# Patient Record
Sex: FEMALE | Race: WHITE | NOT HISPANIC OR LATINO | Employment: FULL TIME | URBAN - METROPOLITAN AREA
[De-identification: names, ages, dates, MRNs, and addresses within clinical notes are randomized per-mention and may not be internally consistent; named-entity substitution may affect disease eponyms.]

---

## 2018-09-05 ENCOUNTER — OFFICE VISIT (OUTPATIENT)
Dept: URGENT CARE | Facility: PHYSICIAN GROUP | Age: 33
End: 2018-09-05

## 2018-09-05 ENCOUNTER — HOSPITAL ENCOUNTER (OUTPATIENT)
Facility: MEDICAL CENTER | Age: 33
End: 2018-09-05
Attending: PHYSICIAN ASSISTANT

## 2018-09-05 VITALS
TEMPERATURE: 98.7 F | HEIGHT: 64 IN | BODY MASS INDEX: 20.83 KG/M2 | WEIGHT: 122 LBS | RESPIRATION RATE: 18 BRPM | HEART RATE: 69 BPM | SYSTOLIC BLOOD PRESSURE: 138 MMHG | DIASTOLIC BLOOD PRESSURE: 72 MMHG | OXYGEN SATURATION: 97 %

## 2018-09-05 DIAGNOSIS — N89.8 VAGINAL DISCHARGE: ICD-10-CM

## 2018-09-05 DIAGNOSIS — N76.5 VAGINAL ULCERATION: ICD-10-CM

## 2018-09-05 PROCEDURE — 87591 N.GONORRHOEAE DNA AMP PROB: CPT

## 2018-09-05 PROCEDURE — 99204 OFFICE O/P NEW MOD 45 MIN: CPT | Performed by: PHYSICIAN ASSISTANT

## 2018-09-05 PROCEDURE — 87491 CHLMYD TRACH DNA AMP PROBE: CPT

## 2018-09-05 PROCEDURE — 87480 CANDIDA DNA DIR PROBE: CPT

## 2018-09-05 PROCEDURE — 87660 TRICHOMONAS VAGIN DIR PROBE: CPT

## 2018-09-05 PROCEDURE — 87529 HSV DNA AMP PROBE: CPT

## 2018-09-05 PROCEDURE — 87510 GARDNER VAG DNA DIR PROBE: CPT

## 2018-09-05 RX ORDER — CLINDAMYCIN HYDROCHLORIDE 300 MG/1
300 CAPSULE ORAL 3 TIMES DAILY
Qty: 20 CAP | Refills: 0 | Status: SHIPPED | OUTPATIENT
Start: 2018-09-05 | End: 2018-09-15

## 2018-09-05 RX ORDER — VALACYCLOVIR HYDROCHLORIDE 1 G/1
1000 TABLET, FILM COATED ORAL 3 TIMES DAILY
Qty: 21 TAB | Refills: 0 | Status: SHIPPED | OUTPATIENT
Start: 2018-09-05 | End: 2018-09-12

## 2018-09-05 RX ORDER — LIDOCAINE 40 MG/G
1 CREAM TOPICAL 2 TIMES DAILY PRN
Qty: 1 TUBE | Refills: 0 | Status: SHIPPED | OUTPATIENT
Start: 2018-09-05 | End: 2020-01-09

## 2018-09-06 DIAGNOSIS — N89.8 VAGINAL DISCHARGE: ICD-10-CM

## 2018-09-06 DIAGNOSIS — N76.5 VAGINAL ULCERATION: ICD-10-CM

## 2018-09-06 ASSESSMENT — ENCOUNTER SYMPTOMS
PALPITATIONS: 0
FLANK PAIN: 0
FEVER: 0
BACK PAIN: 0
CHILLS: 0
COUGH: 0
SHORTNESS OF BREATH: 0

## 2018-09-06 NOTE — PATIENT INSTRUCTIONS
Genital Herpes  Genital herpes is a common sexually transmitted infection (STI) that is caused by a virus. The virus is spread from person to person through sexual contact. Infection can cause itching, blisters, and sores in the genital area or rectal area. This is called an outbreak. It affects both men and women.  Genital herpes is particularly concerning for pregnant women because the virus can be passed to the baby during delivery and cause serious problems. Genital herpes is also a concern for people with a weakened defense (immune) system.  Symptoms of genital herpes may last several days and then go away. However, the virus remains in your body, so you may have more outbreaks of symptoms in the future. The time between outbreaks varies and can be months or years.  CAUSES  Genital herpes is caused by a virus called herpes simplex virus (HSV) type 2 or HSV type 1. These viruses are contagious and are most often spread through sexual contact with an infected person. Sexual contact includes vaginal, anal, and oral sex.  RISK FACTORS  Risk factors for genital herpes include:  · Being sexually active with multiple partners.  · Having unprotected sex.  SIGNS AND SYMPTOMS  Symptoms may include:  · Pain and itching in the genital area or rectal area.  · Small red bumps that turn into blisters and then turn into sores.  · Flu-like symptoms, including:  ¨ Fever.  ¨ Body aches.  · Painful urination.  · Vaginal discharge.  DIAGNOSIS  Genital herpes may be diagnosed by:  · Physical exam.  · Blood test.  · Fluid culture test from an open sore.  TREATMENT  There is no cure for genital herpes. Oral antiviral medicines may be used to speed up healing and to help prevent the return of symptoms. These medicines can also help to reduce the spread of the virus to sexual partners.  HOME CARE INSTRUCTIONS  · Keep the affected areas dry and clean.  · Take medicines only as directed by your health care provider.  · Do not have sexual  contact during active infections. Genital herpes is contagious.  · Practice safe sex. Latex condoms and female condoms may help to prevent the spread of the herpes virus.  · Avoid rubbing or touching the blisters and sores. If you do touch the blister or sores:  ¨ Wash your hands thoroughly.  ¨ Do not touch your eyes afterward.  · If you become pregnant, tell your health care provider if you have had genital herpes.  · Keep all follow-up visits as directed by your health care provider. This is important.  PREVENTION  · Use condoms. Although anyone can contract genital herpes during sexual contact even with the use of a condom, a condom can provide some protection.  · Avoid having multiple sexual partners.  · Talk to your sexual partner about any symptoms and past history that either of you may have.  · Get tested before you have sex. Ask your partner to do the same.  · Recognize the symptoms of genital herpes. Do not have sexual contact if you notice these symptoms.  SEEK MEDICAL CARE IF:  · Your symptoms are not improving with medicine.  · Your symptoms return.  · You have new symptoms.  · You have a fever.  · You have abdominal pain.  · You have redness, swelling, or pain in your eye.  MAKE SURE YOU:  · Understand these instructions.  · Will watch your condition.  · Will get help right away if you are not doing well or get worse.  This information is not intended to replace advice given to you by your health care provider. Make sure you discuss any questions you have with your health care provider.  Document Released: 12/15/2001 Document Revised: 01/08/2016 Document Reviewed: 05/05/2015  [employer] Interactive Patient Education © 2017 [employer]

## 2018-09-06 NOTE — PROGRESS NOTES
Subjective:      Jazmine Reinoso is a 32 y.o. female who presents with Rash (vaginal rash, thought was razor burn , discharge, white bumps)            Vaginal Pain   This is a new problem. The current episode started in the past 7 days. The problem occurs constantly. The problem has been gradually worsening. Pertinent negatives include no chest pain, chills, coughing or fever. Associated symptoms comments: Vaginal discharge/ulcerations/blisters  . Nothing aggravates the symptoms. She has tried nothing for the symptoms.       Review of Systems   Constitutional: Negative for chills and fever.   Respiratory: Negative for cough and shortness of breath.    Cardiovascular: Negative for chest pain and palpitations.   Genitourinary: Positive for dysuria and vaginal pain. Negative for flank pain, frequency, hematuria and urgency.        Vaginal discharge/rash   Musculoskeletal: Negative for back pain.   All other systems reviewed and are negative.    PMH:  has no past medical history on file.  MEDS:   Current Outpatient Prescriptions:   •  valacyclovir (VALTREX) 1 GM Tab, Take 1 Tab by mouth 3 times a day for 7 days., Disp: 21 Tab, Rfl: 0  •  clindamycin (CLEOCIN) 300 MG Cap, Take 1 Cap by mouth 3 times a day for 10 days., Disp: 20 Cap, Rfl: 0  •  lidocaine (LMX) 4 % Cream, Apply 1 Application to affected area(s) 2 times a day as needed., Disp: 1 Tube, Rfl: 0  •  tramadol (ULTRAM) 50 MG Tab, Take 1-2 Tabs by mouth every 6 hours as needed., Disp: 12 Tab, Rfl: 0  •  azithromycin (ZITHROMAX Z-SCOTTY) 250 MG TABS, 2 tablets by mouth on day  then 1 tablet by mouth daily for the next 4 days, Disp: 6 Tab, Rfl: 0  •  benzonatate (TESSALON) 100 MG CAPS, Take 1 Cap by mouth 3 times a day as needed for Cough., Disp: 20 Cap, Rfl: 0  ALLERGIES:   Allergies   Allergen Reactions   • Amoxicillin    • Pcn [Penicillins]      SURGHX: No past surgical history on file.  SOCHX:  reports that she has quit smoking. She has never used smokeless  "tobacco. She reports that she drinks alcohol. She reports that she uses drugs.  FH: Family history was reviewed, no pertinent findings to report  Medications, Allergies, and current problem list reviewed today in Epic       Objective:     /72   Pulse 69   Temp 37.1 °C (98.7 °F)   Resp 18   Ht 1.626 m (5' 4\")   Wt 55.3 kg (122 lb)   SpO2 97%   BMI 20.94 kg/m²      Physical Exam   Constitutional: She is oriented to person, place, and time. She appears well-developed and well-nourished.   HENT:   Head: Normocephalic and atraumatic.   Right Ear: External ear normal.   Left Ear: External ear normal.   Nose: Nose normal.   Mouth/Throat: Oropharynx is clear and moist.   Neck: Normal range of motion. Neck supple.   Cardiovascular: Normal rate, regular rhythm and normal heart sounds.    Pulmonary/Chest: Effort normal and breath sounds normal.   Abdominal: Soft.   Genitourinary: Vaginal discharge found.   Genitourinary Comments: Multiple vesicular and ulcerated lesions on the vulva/labia.  Thin green discharge in cervix   Musculoskeletal: She exhibits no tenderness.   No CVA tenderness present.   Neurological: She is alert and oriented to person, place, and time.   Skin: Skin is warm and dry.   Psychiatric: She has a normal mood and affect. Her behavior is normal. Judgment and thought content normal.   Vitals reviewed.              Assessment/Plan:   Chaperone in room.  Likely herpes simplex with bacterial vaginosis.  Cultures pending.  1. Vaginal discharge    - CHLAMYDIA/GC PCR URINE OR SWAB; Future  - VAGINAL PATHOGENS DNA PANEL; Future  - clindamycin (CLEOCIN) 300 MG Cap; Take 1 Cap by mouth 3 times a day for 10 days.  Dispense: 20 Cap; Refill: 0  - lidocaine (LMX) 4 % Cream; Apply 1 Application to affected area(s) 2 times a day as needed.  Dispense: 1 Tube; Refill: 0    2. Vaginal ulceration    - valacyclovir (VALTREX) 1 GM Tab; Take 1 Tab by mouth 3 times a day for 7 days.  Dispense: 21 Tab; Refill: 0  - " lidocaine (LMX) 4 % Cream; Apply 1 Application to affected area(s) 2 times a day as needed.  Dispense: 1 Tube; Refill: 0  - HSV 1/2 BY PCR(HERPES); Future    Differential diagnosis, natural history, supportive care discussed. Follow-up with primary care provider within 7-10 days, emergency room precautions discussed.  Patient and/or family appears understanding of information.  Handout and review of patients diagnosis and treatment was discussed extensively.

## 2018-09-07 LAB
C TRACH DNA SPEC QL NAA+PROBE: NEGATIVE
CANDIDA DNA VAG QL PROBE+SIG AMP: NEGATIVE
G VAGINALIS DNA VAG QL PROBE+SIG AMP: POSITIVE
HSV1 DNA SPEC QL NAA+PROBE: POSITIVE
HSV2 DNA SPEC QL NAA+PROBE: NEGATIVE
N GONORRHOEA DNA SPEC QL NAA+PROBE: NEGATIVE
SPECIMEN SOURCE: ABNORMAL
SPECIMEN SOURCE: NORMAL
T VAGINALIS DNA VAG QL PROBE+SIG AMP: NEGATIVE

## 2018-09-24 ENCOUNTER — TELEPHONE (OUTPATIENT)
Dept: URGENT CARE | Facility: PHYSICIAN GROUP | Age: 33
End: 2018-09-24

## 2018-09-24 NOTE — TELEPHONE ENCOUNTER
Pt called stating that she never got a call back regarding her test results. I spoke to Pablo and he stated that he left a voicemail with the positive results. I called her back and relayed the results again.     She stated that she was having an outbreak of lesions and that she needs more medication. She was instructed to be seen again at the UC or ER.       Thank you

## 2019-12-28 ENCOUNTER — TELEPHONE (OUTPATIENT)
Dept: SCHEDULING | Facility: IMAGING CENTER | Age: 34
End: 2019-12-28

## 2020-01-09 ENCOUNTER — OFFICE VISIT (OUTPATIENT)
Dept: MEDICAL GROUP | Facility: PHYSICIAN GROUP | Age: 35
End: 2020-01-09
Payer: COMMERCIAL

## 2020-01-09 VITALS
RESPIRATION RATE: 18 BRPM | HEIGHT: 64 IN | HEART RATE: 100 BPM | SYSTOLIC BLOOD PRESSURE: 120 MMHG | TEMPERATURE: 98 F | OXYGEN SATURATION: 98 % | DIASTOLIC BLOOD PRESSURE: 80 MMHG | BODY MASS INDEX: 19.81 KG/M2 | WEIGHT: 116 LBS

## 2020-01-09 DIAGNOSIS — A60.00 HERPES SIMPLEX INFECTION OF GENITOURINARY SYSTEM: ICD-10-CM

## 2020-01-09 DIAGNOSIS — Z13.6 SCREENING FOR CARDIOVASCULAR CONDITION: ICD-10-CM

## 2020-01-09 DIAGNOSIS — Z82.49 FAMILY HISTORY OF HEART ATTACK: ICD-10-CM

## 2020-01-09 DIAGNOSIS — F39 MOOD DISORDER (HCC): ICD-10-CM

## 2020-01-09 PROCEDURE — 99203 OFFICE O/P NEW LOW 30 MIN: CPT | Performed by: FAMILY MEDICINE

## 2020-01-09 RX ORDER — VALACYCLOVIR HYDROCHLORIDE 1 G/1
1000 TABLET, FILM COATED ORAL 2 TIMES DAILY
Qty: 14 TAB | Refills: 2 | Status: SHIPPED | OUTPATIENT
Start: 2020-01-09 | End: 2021-05-13

## 2020-01-09 ASSESSMENT — PATIENT HEALTH QUESTIONNAIRE - PHQ9: CLINICAL INTERPRETATION OF PHQ2 SCORE: 0

## 2020-01-09 NOTE — PROGRESS NOTES
"cc: Herpes      Subjective:     Jazmine Reinoso is a 34 y.o. female presenting for the following:     Patient with first outbreak of genital herpes about a year and a half ago.  She was getting a lot of outbreaks at first but now they do seem to be spacing more.  It was improved with Valtrex in the past.  She did not have any side effect with this medication.  She is wondering if she can have a course for home.  She does not have symptoms currently.    Patient has had some difficulty with mood over the last week.  She has been feeling some low mood, anhedonia, decreased sleep.  She did lose her mother about 15 years ago and it has been more difficult during the holidays.  No thoughts of hurting herself or suicidal/homicidal ideation.  She is normally very upbeat and does not have problems with depression or anxiety in the past.    Review of systems:  All others reviewed and are negative.       Current Outpatient Medications:   •  valacyclovir (VALTREX) 1 GM Tab, Take 1 Tab by mouth 2 times a day., Disp: 14 Tab, Rfl: 2    Allergies, past medical history, past surgical history, family history, social history reviewed and updated    Objective:     Vitals: /80 (BP Location: Right arm, Patient Position: Sitting, BP Cuff Size: Adult)   Pulse 100   Temp 36.7 °C (98 °F) (Temporal)   Resp 18   Ht 1.626 m (5' 4\")   Wt 52.6 kg (116 lb)   SpO2 98%   BMI 19.91 kg/m²   General: Alert, pleasant, NAD  HEENT: Normocephalic.   EOMI, no icterus or pallor.  Conjunctivae and lids normal. External ears and nose normal. Oropharynx non-erythematous, mucous membranes moist.    Neck supple.  No thyromegaly or masses palpated. No cervical or supraclavicular lymphadenopathy.  Heart: Regular rate and rhythm.  S1 and S2 normal.  No murmurs appreciated.  Respiratory: Normal respiratory effort.  Clear to auscultation bilaterally.  Abdomen: Non-distended, soft  Skin: Warm, dry, no rashes in exposed areas.  Musculoskeletal: Gait is " normal.  Moves all extremities well.  Extremities: No leg edema.    Neurological: sensation grossly intact,  tone/strength normal, gait is normal, CN2-12 grossly intact  Psych:  Affect is normal, judgement is good, grooming is appropriate.    Assessment/Plan:     Jazmine was seen today for establish care.    Diagnoses and all orders for this visit:    Herpes simplex infection of genitourinary system: As patient is now only having an outbreak every 4-6 months, will give Valtrex to take at onset of symptoms.  Patient warned of common side effects of this medication but she has taken it in the past without side effect.  -     valacyclovir (VALTREX) 1 GM Tab; Take 1 Tab by mouth 2 times a day.    Mood disorder (HCC): Patient feeling down for about the last week.  Symptoms have been slowly improving and no suicidal thoughts.  This is just abnormal for the patient as she tends to be very upbeat.  So we will check for anemia, thyroid dysfunction, hepatic dysfunction.  -     Comp Metabolic Panel; Future  -     CBC WITHOUT DIFFERENTIAL; Future  -     TSH WITH REFLEX TO FT4; Future    Family history of heart attack: Father with heart attack in his 50s.  She has never had lipid profile checked so we will check once while she is young to ensure no familial hypercholesterolemia.  -     Lipid Profile; Future    Screening for cardiovascular condition  -     Lipid Profile; Future      Return in about 1 week (around 1/16/2020), or if symptoms worsen or fail to improve, for pap.

## 2020-01-15 ENCOUNTER — HOSPITAL ENCOUNTER (OUTPATIENT)
Dept: LAB | Facility: MEDICAL CENTER | Age: 35
End: 2020-01-15
Attending: FAMILY MEDICINE
Payer: COMMERCIAL

## 2020-01-15 DIAGNOSIS — F39 MOOD DISORDER (HCC): ICD-10-CM

## 2020-01-15 DIAGNOSIS — Z13.6 SCREENING FOR CARDIOVASCULAR CONDITION: ICD-10-CM

## 2020-01-15 DIAGNOSIS — Z82.49 FAMILY HISTORY OF HEART ATTACK: ICD-10-CM

## 2020-01-15 LAB
ALBUMIN SERPL BCP-MCNC: 4.7 G/DL (ref 3.2–4.9)
ALBUMIN/GLOB SERPL: 2 G/DL
ALP SERPL-CCNC: 46 U/L (ref 30–99)
ALT SERPL-CCNC: 16 U/L (ref 2–50)
ANION GAP SERPL CALC-SCNC: 10 MMOL/L (ref 0–11.9)
AST SERPL-CCNC: 22 U/L (ref 12–45)
BILIRUB SERPL-MCNC: 1 MG/DL (ref 0.1–1.5)
BUN SERPL-MCNC: 9 MG/DL (ref 8–22)
CALCIUM SERPL-MCNC: 9.6 MG/DL (ref 8.5–10.5)
CHLORIDE SERPL-SCNC: 101 MMOL/L (ref 96–112)
CHOLEST SERPL-MCNC: 151 MG/DL (ref 100–199)
CO2 SERPL-SCNC: 26 MMOL/L (ref 20–33)
CREAT SERPL-MCNC: 0.73 MG/DL (ref 0.5–1.4)
ERYTHROCYTE [DISTWIDTH] IN BLOOD BY AUTOMATED COUNT: 43 FL (ref 35.9–50)
FASTING STATUS PATIENT QL REPORTED: NORMAL
GLOBULIN SER CALC-MCNC: 2.4 G/DL (ref 1.9–3.5)
GLUCOSE SERPL-MCNC: 77 MG/DL (ref 65–99)
HCT VFR BLD AUTO: 41.7 % (ref 37–47)
HDLC SERPL-MCNC: 69 MG/DL
HGB BLD-MCNC: 14.4 G/DL (ref 12–16)
LDLC SERPL CALC-MCNC: 73 MG/DL
MCH RBC QN AUTO: 34 PG (ref 27–33)
MCHC RBC AUTO-ENTMCNC: 34.5 G/DL (ref 33.6–35)
MCV RBC AUTO: 98.3 FL (ref 81.4–97.8)
PLATELET # BLD AUTO: 272 K/UL (ref 164–446)
PMV BLD AUTO: 10.2 FL (ref 9–12.9)
POTASSIUM SERPL-SCNC: 3.7 MMOL/L (ref 3.6–5.5)
PROT SERPL-MCNC: 7.1 G/DL (ref 6–8.2)
RBC # BLD AUTO: 4.24 M/UL (ref 4.2–5.4)
SODIUM SERPL-SCNC: 137 MMOL/L (ref 135–145)
TRIGL SERPL-MCNC: 47 MG/DL (ref 0–149)
TSH SERPL DL<=0.005 MIU/L-ACNC: 0.96 UIU/ML (ref 0.38–5.33)
WBC # BLD AUTO: 8 K/UL (ref 4.8–10.8)

## 2020-01-15 PROCEDURE — 84443 ASSAY THYROID STIM HORMONE: CPT

## 2020-01-15 PROCEDURE — 85027 COMPLETE CBC AUTOMATED: CPT

## 2020-01-15 PROCEDURE — 80061 LIPID PANEL: CPT

## 2020-01-15 PROCEDURE — 36415 COLL VENOUS BLD VENIPUNCTURE: CPT

## 2020-01-15 PROCEDURE — 80053 COMPREHEN METABOLIC PANEL: CPT

## 2020-01-20 ENCOUNTER — OFFICE VISIT (OUTPATIENT)
Dept: MEDICAL GROUP | Facility: PHYSICIAN GROUP | Age: 35
End: 2020-01-20
Payer: COMMERCIAL

## 2020-01-20 ENCOUNTER — HOSPITAL ENCOUNTER (OUTPATIENT)
Facility: MEDICAL CENTER | Age: 35
End: 2020-01-20
Attending: FAMILY MEDICINE
Payer: COMMERCIAL

## 2020-01-20 VITALS
OXYGEN SATURATION: 96 % | HEART RATE: 95 BPM | TEMPERATURE: 98.5 F | WEIGHT: 117 LBS | DIASTOLIC BLOOD PRESSURE: 70 MMHG | HEIGHT: 64 IN | BODY MASS INDEX: 19.97 KG/M2 | SYSTOLIC BLOOD PRESSURE: 110 MMHG

## 2020-01-20 DIAGNOSIS — Z11.51 SCREENING FOR HPV (HUMAN PAPILLOMAVIRUS): ICD-10-CM

## 2020-01-20 DIAGNOSIS — Z12.4 SCREENING FOR MALIGNANT NEOPLASM OF CERVIX: ICD-10-CM

## 2020-01-20 DIAGNOSIS — Z01.419 WELL WOMAN EXAM WITH ROUTINE GYNECOLOGICAL EXAM: ICD-10-CM

## 2020-01-20 PROCEDURE — 87624 HPV HI-RISK TYP POOLED RSLT: CPT

## 2020-01-20 PROCEDURE — 99395 PREV VISIT EST AGE 18-39: CPT | Performed by: FAMILY MEDICINE

## 2020-01-20 PROCEDURE — 88175 CYTOPATH C/V AUTO FLUID REDO: CPT

## 2020-01-20 NOTE — PROGRESS NOTES
"S:  Jazmine Reinoso is a 34 y.o.,femalewho presents today for her Pap and GYN exam.  Her LMP was 2 weeks ago.  She is still having regular menses.  Her form of contraception is lack of male partner.     She is , P:0.    She has never had a pap smear previously.   Her last Mammogram was done: due at 43 yo .     GYN ROS:  normal menses, no abnormal bleeding, pelvic pain or discharge, no breast pain or new or enlarging lumps on self exam    Patient Active Problem List    Diagnosis Date Noted   • Genital herpes 2020   • Mood disorder (HCC) 2020   • Family history of heart attack 2020     Current Outpatient Medications on File Prior to Visit   Medication Sig Dispense Refill   • valacyclovir (VALTREX) 1 GM Tab Take 1 Tab by mouth 2 times a day. 14 Tab 2     No current facility-administered medications on file prior to visit.      Social History     Tobacco Use   • Smoking status: Not on file   • Smokeless tobacco: Never Used   Substance Use Topics   • Alcohol use: Yes     Comment: OCCAS       O: /70 (BP Location: Left arm, Patient Position: Sitting, BP Cuff Size: Adult)   Pulse 95   Temp 36.9 °C (98.5 °F) (Temporal)   Ht 1.626 m (5' 4\")   Wt 53.1 kg (117 lb)   SpO2 96%   BMI 20.08 kg/m²   Vitals Noted and Reviewed  Lungs: normal to auscultation  Heart: regular rate and rhythm  Vulva: grossly unremarkable, no lesions or masses noted  Vagina: no abnormal discharge, normal color  Cervix: nonparous, slightly friable, no surface lesions identified, Pap was performed  Uterus: Normal shape, position and consistency  Bimanual exam: No uteromegaly, negative chandelier sign, adnexa freely movable and without enlargements bilaterally  Rectal: not performed    Assessment/Plan:   No new sexual partners, declines STD screening.  NormalGYN Exam  Pap processed and sent to the lab.    Recommend follow up 1 yr PRN     "

## 2020-01-21 LAB
CYTOLOGY REG CYTOL: NORMAL
HPV HR 12 DNA CVX QL NAA+PROBE: NEGATIVE
HPV16 DNA SPEC QL NAA+PROBE: NEGATIVE
HPV18 DNA SPEC QL NAA+PROBE: NEGATIVE
SPECIMEN SOURCE: NORMAL

## 2020-03-13 ENCOUNTER — TELEPHONE (OUTPATIENT)
Dept: HEALTH INFORMATION MANAGEMENT | Facility: OTHER | Age: 35
End: 2020-03-13

## 2020-03-13 NOTE — TELEPHONE ENCOUNTER
1. Caller Name: Jazmine Reinoso                      Call Back Number: [phone]  [facility] PCP or Specialty Provider: Yes         2.  Does patient have any active symptoms of respiratory illness (fever OR cough OR shortness of breath)? Yes, the patient reports the following respiratory symptoms: cough and congestion and secretions. No fever. Reports no other symptoms..     3.  Does patient have any comoribidities? None     4.  In the last 30 days, has the patient traveled outside of the country OR in a high risk area within the  OR have any known contact with someone who has or is suspected to have COVID-19?  No.    5. Disposition: Advised to perform self care, monitor for worsening symptoms and to call back in 3 days if no improvement /Monitor symptoms. Provided patient with [facility] information. [employer]    Note routed to PCP: FYI only.

## 2021-05-06 ENCOUNTER — PATIENT OUTREACH (OUTPATIENT)
Dept: MEDICAL GROUP | Facility: MEDICAL CENTER | Age: 36
End: 2021-05-06

## 2021-05-10 ENCOUNTER — HOSPITAL ENCOUNTER (OUTPATIENT)
Dept: RADIOLOGY | Facility: MEDICAL CENTER | Age: 36
End: 2021-05-10
Attending: FAMILY MEDICINE
Payer: COMMERCIAL

## 2021-05-10 ENCOUNTER — OFFICE VISIT (OUTPATIENT)
Dept: URGENT CARE | Facility: PHYSICIAN GROUP | Age: 36
End: 2021-05-10
Payer: COMMERCIAL

## 2021-05-10 VITALS
TEMPERATURE: 98.5 F | OXYGEN SATURATION: 99 % | HEIGHT: 64 IN | SYSTOLIC BLOOD PRESSURE: 132 MMHG | BODY MASS INDEX: 22.2 KG/M2 | RESPIRATION RATE: 16 BRPM | HEART RATE: 77 BPM | DIASTOLIC BLOOD PRESSURE: 90 MMHG | WEIGHT: 130 LBS

## 2021-05-10 DIAGNOSIS — M54.16 LUMBAR RADICULOPATHY, ACUTE: ICD-10-CM

## 2021-05-10 DIAGNOSIS — M54.9 DORSALGIA: ICD-10-CM

## 2021-05-10 PROCEDURE — 99214 OFFICE O/P EST MOD 30 MIN: CPT | Performed by: FAMILY MEDICINE

## 2021-05-10 PROCEDURE — 72100 X-RAY EXAM L-S SPINE 2/3 VWS: CPT

## 2021-05-10 RX ORDER — CYCLOBENZAPRINE HCL 5 MG
5-10 TABLET ORAL 3 TIMES DAILY PRN
Qty: 30 TABLET | Refills: 0 | Status: SHIPPED | OUTPATIENT
Start: 2021-05-10 | End: 2021-12-20

## 2021-05-10 ASSESSMENT — FIBROSIS 4 INDEX: FIB4 SCORE: 0.67

## 2021-05-10 ASSESSMENT — ENCOUNTER SYMPTOMS: BACK PAIN: 1

## 2021-05-10 NOTE — LETTER
May 10, 2021         Patient: Jazmine Reinoso   YOB: 1985   Date of Visit: 5/10/2021           To Whom it May Concern:    Jazmine Reinoso was seen in my clinic on 5/10/2021. She may return to work in 3-4 days.    If you have any questions or concerns, please don't hesitate to call.        Sincerely,           Juan Deng M.D.  Electronically Signed

## 2021-05-10 NOTE — PROGRESS NOTES
"Subjective:      Jazmine Reinoso is a 35 y.o. female who presents with Back Injury (injured back picking up a bed lost feeling in legs x 5 days)    - This is a pleasant and nontoxic appearing 35 y.o. female with c/o was lifting/moving mattress ~ 5 days ago, felt pain to lower back then legs went numb for a few hours after. Sensation has returned to legs but still has low axial back pain that radiates a little up the back.  No fever, no trauma, no bowel/bladder dysfunction or lower limb weakness/heaviness.       ALLERGIES:  Amoxicillin and Pcn [penicillins]     PMH:  History reviewed. No pertinent past medical history.     PSH:  History reviewed. No pertinent surgical history.    MEDS:    Current Outpatient Medications:   •  cyclobenzaprine (FLEXERIL) 5 mg tablet, Take 1-2 Tablets by mouth 3 times a day as needed for Mild Pain or Muscle Spasms., Disp: 30 tablet, Rfl: 0  •  valacyclovir (VALTREX) 1 GM Tab, Take 1 Tab by mouth 2 times a day., Disp: 14 Tab, Rfl: 2    ** I have documented what I find to be significant in regards to past medical, social, family and surgical history  in my HPI or under PMH/PSH/FH review section, otherwise it is noncontributory **             HPI    Review of Systems   Musculoskeletal: Positive for back pain.   All other systems reviewed and are negative.         Objective:     /90 (BP Location: Left arm, Patient Position: Sitting, BP Cuff Size: Adult)   Pulse 77   Temp 36.9 °C (98.5 °F) (Temporal)   Resp 16   Ht 1.626 m (5' 4\")   Wt 59 kg (130 lb)   SpO2 99%   BMI 22.31 kg/m²      Physical Exam  Vitals and nursing note reviewed.   Constitutional:       General: She is not in acute distress.     Appearance: Normal appearance. She is well-developed.   HENT:      Head: Normocephalic and atraumatic.   Eyes:      General: No scleral icterus.  Cardiovascular:      Heart sounds: Normal heart sounds. No murmur.   Pulmonary:      Effort: Pulmonary effort is normal. No respiratory " distress.   Musculoskeletal:        Back:       Comments: No rash, no TTP, + pain w/ ROM. Bilateral lower extremity strength and sensory intact.  Negative straight leg raise.   Can toe and heel walk     Skin:     Coloration: Skin is not jaundiced or pale.   Neurological:      Mental Status: She is alert.      Motor: No abnormal muscle tone.   Psychiatric:         Mood and Affect: Mood normal.         Behavior: Behavior normal.                 Assessment/Plan:          1. Dorsalgia  DX-LUMBAR SPINE-2 OR 3 VIEWS    REFERRAL TO OUTPATIENT INTERVENTIONAL PAIN CLINIC    cyclobenzaprine (FLEXERIL) 5 mg tablet   2. Lumbar radiculopathy, acute  REFERRAL TO OUTPATIENT INTERVENTIONAL PAIN CLINIC       - Dx, plan & d/c instructions discussed w/ patient and/or family members  - Rest, stay hydrated OTC Motrin (3 tabs qhrs x 5 days)  - E.R. precautions discussed     Follow up with their PCP in 2-3 days, ER if not improving or feeling/getting worse.    Any realistic side effects of medications that may have been given today reviewed.     Patient left in stable condition       Please note that this dictation was created using voice recognition software. I have made every reasonable attempt to correct obvious errors, but I expect that there are errors of grammar and possibly content that I did not discover before finalizing the note.

## 2021-05-13 ENCOUNTER — OFFICE VISIT (OUTPATIENT)
Dept: MEDICAL GROUP | Facility: MEDICAL CENTER | Age: 36
End: 2021-05-13
Payer: COMMERCIAL

## 2021-05-13 VITALS
HEIGHT: 64 IN | BODY MASS INDEX: 22.2 KG/M2 | SYSTOLIC BLOOD PRESSURE: 124 MMHG | WEIGHT: 130 LBS | HEART RATE: 87 BPM | DIASTOLIC BLOOD PRESSURE: 78 MMHG | OXYGEN SATURATION: 98 % | TEMPERATURE: 97.8 F

## 2021-05-13 DIAGNOSIS — M54.50 ACUTE BILATERAL LOW BACK PAIN WITHOUT SCIATICA: ICD-10-CM

## 2021-05-13 DIAGNOSIS — A60.00 HERPES SIMPLEX INFECTION OF GENITOURINARY SYSTEM: ICD-10-CM

## 2021-05-13 DIAGNOSIS — A60.04 HERPES SIMPLEX VULVOVAGINITIS: ICD-10-CM

## 2021-05-13 PROCEDURE — 99214 OFFICE O/P EST MOD 30 MIN: CPT | Performed by: PHYSICIAN ASSISTANT

## 2021-05-13 RX ORDER — KETOROLAC TROMETHAMINE 30 MG/ML
30 INJECTION, SOLUTION INTRAMUSCULAR; INTRAVENOUS ONCE
Status: COMPLETED | OUTPATIENT
Start: 2021-05-13 | End: 2021-05-13

## 2021-05-13 RX ORDER — METHYLPREDNISOLONE 4 MG/1
TABLET ORAL
Qty: 21 TABLET | Refills: 0 | Status: SHIPPED | OUTPATIENT
Start: 2021-05-13 | End: 2021-12-20

## 2021-05-13 RX ORDER — VALACYCLOVIR HYDROCHLORIDE 500 MG/1
500 TABLET, FILM COATED ORAL 2 TIMES DAILY
Qty: 6 TABLET | Refills: 4 | Status: SHIPPED | OUTPATIENT
Start: 2021-05-13 | End: 2022-01-18 | Stop reason: SDUPTHER

## 2021-05-13 RX ADMIN — KETOROLAC TROMETHAMINE 30 MG: 30 INJECTION, SOLUTION INTRAMUSCULAR; INTRAVENOUS at 07:50

## 2021-05-13 ASSESSMENT — FIBROSIS 4 INDEX: FIB4 SCORE: 0.67

## 2021-05-13 ASSESSMENT — PATIENT HEALTH QUESTIONNAIRE - PHQ9: CLINICAL INTERPRETATION OF PHQ2 SCORE: 0

## 2021-05-13 NOTE — ASSESSMENT & PLAN NOTE
Chronic: Stable.  Patient is taking Valtrex with good effect.  Gets approximately 2-3 episodes per year in genital region.  Diagnosed approximately 2 years ago after having painful lesions in the genital region.

## 2021-05-13 NOTE — PROGRESS NOTES
Subjective:   Jazmine Reinoso is a 35 y.o. female here today for back pain and establishing care    Acute bilateral low back pain without sciatica  New Issue: Pleasant 35-year-old here to establish care reports new issue of low back pain.  Occurred after lifting a heavy mattress 7 days ago.  Felt strong sensation of discomfort that is worsening.  Movement makes it worse.  At that time felt bilateral lower extremities tingling as well as some heat in the feet.  That subsided.  Has no problems with bowel or bladder incontinence.  Denies weakness with gait.  Denies known fall trauma or injury.  With her work she lifts many boxes daily and this has become more challenging.      Genital herpes  Chronic: Stable.  Patient is taking Valtrex with good effect.  Gets approximately 2-3 episodes per year in genital region.  Diagnosed approximately 2 years ago after having painful lesions in the genital region.         Current medicines (including changes today)  Current Outpatient Medications   Medication Sig Dispense Refill   • methylPREDNISolone (MEDROL DOSEPAK) 4 MG Tablet Therapy Pack As directed on the packaging label. 21 tablet 0   • valacyclovir (VALTREX) 500 MG Tab Take 1 tablet by mouth 2 times a day for 15 days. 6 tablet 4   • cyclobenzaprine (FLEXERIL) 5 mg tablet Take 1-2 Tablets by mouth 3 times a day as needed for Mild Pain or Muscle Spasms. 30 tablet 0     No current facility-administered medications for this visit.     She  has a past medical history of Herpes (2019).  Amoxicillin and Pcn [penicillins]     Social History and Family History were reviewed and updated.    ROS   No headaches, chest pain, no shortness of breath, abdominal pain, nausea, or vomiting.  All other systems were reviewed and are negative or noted as positive in the HPI.       Objective:     /78 (BP Location: Right arm, Patient Position: Sitting, BP Cuff Size: Adult)   Pulse 87   Temp 36.6 °C (97.8 °F) (Temporal)   Ht 1.626 m (5'  "4\")   Wt 59 kg (130 lb)   SpO2 98%  Body mass index is 22.31 kg/m².     Physical Exam:  Constitutional: ANO x3, no acute distress, well-nourished, well-hydrated   Skin: Warm, dry, good turgor, no rashes in visible areas.  HEENT: Is normocephalic atraumatic, good PERRLA, extraocular movements intact, TMs and oropharynx are clear with good dentition   Neck: Soft and supple, trachea midline, no masses.  No thyroid megaly or cervical lymphadenopathy noted  Cardiovascular: Regular rate and rhythm.  Normal S1 and 2, no murmurs, rubs or gallops.  No edema noted  Lungs: Clear to auscultation bilaterally.  No wheezes, rales or rhonchi.  Good inspiratory and expiratory breath sounds  Abdomen: Soft, non-tender, no masses.  No hepatosplenomegaly.  Negative Ludwig's  Psych: Alert and oriented x3, normal affect and mood.  Neuro: Cranial nerves II through XII were assessed and intact.  Normal gait, normal cerebellar function noted  Musculoskeletal: Full range of motion, good strength against resistance patient has pain to palpation in the lower paralumbar region.  There is no pain to palpation in the cervical thoracic or lumbar spinous processes.  She has minimally decreased range of motion with flexion at the waist.  Negative straight leg raise.  Normal sensation throughout.  Good pulses and reflexes    Clinical Course/Lab Analysis:    Toradol 30 mg IM x1 given in clinic    Assessment and Plan:   The following treatment plan was discussed.  Signs and symptoms for which to return were discussed with patient at length.  Patient verbalized understanding.    1. Acute bilateral low back pain without sciatica  New: Unstable  - ketorolac (TORADOL) injection 30 mg  - methylPREDNISolone (MEDROL DOSEPAK) 4 MG Tablet Therapy Pack; As directed on the packaging label.  Dispense: 21 tablet; Refill: 0  If Symptoms worsen despite supportive care measures, Will consider MRI.  Patient does have referral to physiatry put in by urgent care and " this is pending    2. Herpes simplex vulvovaginitis    3. Herpes simplex infection of genitourinary system  Chronic: stable  - valacyclovir (VALTREX) 500 MG Tab; Take 1 tablet by mouth 2 times a day for 15 days.  Dispense: 6 tablet; Refill: 4     Will refill for recurrent episodic treatment.    My total time spent caring for the patient on the day of the encounter was 50 minutes. This does not include time spent on separately billable procedures/tests.        Followup: 2 weeks    Please note that this dictation was created using voice recognition software. I have made every reasonable attempt to correct obvious errors, but I expect that there are errors of grammar and possibly content that I did not discover before finalizing the note.

## 2021-05-13 NOTE — ASSESSMENT & PLAN NOTE
New Issue: Pleasant 35-year-old here to establish care reports new issue of low back pain.  Occurred after lifting a heavy mattress 7 days ago.  Felt strong sensation of discomfort that is worsening.  Movement makes it worse.  At that time felt bilateral lower extremities tingling as well as some heat in the feet.  That subsided.  Has no problems with bowel or bladder incontinence.  Denies weakness with gait.  Denies known fall trauma or injury.  With her work she lifts many boxes daily and this has become more challenging.

## 2021-05-13 NOTE — LETTER
May 13, 2021         Patient: Jazmine Reinoso   YOB: 1985   Date of Visit: 5/13/2021           To Whom it May Concern:    Jazmine Reinoso was seen in my clinic on 5/13/2021. She may Return to work 5-    If you have any questions or concerns, please don't hesitate to call.        Sincerely,           Fransisca Carranza P.A.-C.  Electronically Signed

## 2021-12-11 ENCOUNTER — APPOINTMENT (OUTPATIENT)
Dept: RADIOLOGY | Facility: MEDICAL CENTER | Age: 36
End: 2021-12-11
Attending: EMERGENCY MEDICINE
Payer: COMMERCIAL

## 2021-12-11 ENCOUNTER — HOSPITAL ENCOUNTER (EMERGENCY)
Facility: MEDICAL CENTER | Age: 36
End: 2021-12-11
Attending: EMERGENCY MEDICINE
Payer: COMMERCIAL

## 2021-12-11 VITALS
HEART RATE: 88 BPM | WEIGHT: 116.84 LBS | DIASTOLIC BLOOD PRESSURE: 96 MMHG | SYSTOLIC BLOOD PRESSURE: 135 MMHG | HEIGHT: 64 IN | OXYGEN SATURATION: 98 % | BODY MASS INDEX: 19.95 KG/M2 | RESPIRATION RATE: 16 BRPM | TEMPERATURE: 98.6 F

## 2021-12-11 DIAGNOSIS — J40 BRONCHITIS: ICD-10-CM

## 2021-12-11 LAB
FLUAV AG SPEC QL IA: NEGATIVE
FLUBV AG SPEC QL IA: NEGATIVE
SARS-COV+SARS-COV-2 AG RESP QL IA.RAPID: NOTDETECTED
SPECIMEN SOURCE: NORMAL

## 2021-12-11 PROCEDURE — 99283 EMERGENCY DEPT VISIT LOW MDM: CPT

## 2021-12-11 PROCEDURE — 87426 SARSCOV CORONAVIRUS AG IA: CPT

## 2021-12-11 PROCEDURE — 87400 INFLUENZA A/B EACH AG IA: CPT | Mod: 91

## 2021-12-11 PROCEDURE — C9803 HOPD COVID-19 SPEC COLLECT: HCPCS | Performed by: EMERGENCY MEDICINE

## 2021-12-11 PROCEDURE — 71045 X-RAY EXAM CHEST 1 VIEW: CPT

## 2021-12-11 RX ORDER — AZITHROMYCIN 250 MG/1
TABLET, FILM COATED ORAL
Qty: 6 TABLET | Refills: 0 | Status: SHIPPED | OUTPATIENT
Start: 2021-12-11 | End: 2021-12-20

## 2021-12-11 ASSESSMENT — FIBROSIS 4 INDEX: FIB4 SCORE: .6875

## 2021-12-11 NOTE — ED TRIAGE NOTES
"Presents complaining of productive cough, fatigue, and exertional dyspnea recurring for approximately 2 weeks.   Chief Complaint   Patient presents with   • Cough   • Fatigue   • Shortness of Breath     /98   Pulse 80   Temp 36.9 °C (98.5 °F) (Temporal)   Resp 16   Ht 1.626 m (5' 4\")   Wt 53 kg (116 lb 13.5 oz)   LMP 11/27/2021 (Approximate)   SpO2 96%   BMI 20.06 kg/m²   Has this patient been vaccinated for COVID NO  If not, would they like to be vaccinated while in the ER if eligible?  NO  Would the patient like to speak with the ERP about the possibility of receiving the COVID vaccine today before making a decision? NO    "

## 2021-12-11 NOTE — ED PROVIDER NOTES
"ED Provider Note    CHIEF COMPLAINT  Chief Complaint   Patient presents with   • Cough   • Fatigue   • Shortness of Breath       HPI  Jazmine Reinoso is a 36 y.o. female who presents for evaluation of cough and shortness of breath.  The patient states that the Sunday before Thanksgiving she began developing upper respiratory symptoms nasal congestion clear rhinorrhea along with a cough and shortness of breath.  The patient has had some slight yellow sputum production.  The patient took home Covid test which were negative.  Patient presents here because of persistent symptomatology.  Patient states that she has had some intermittent vomiting but no protracted vomiting or diarrhea.  The patient has not been vaccinated.  No previous history of Covid.  She denies any possibly pregnancy as she is lesbian.  No other acute symptomatology or complaints.    REVIEW OF SYSTEMS  See HPI for further details.  No history of hypertension, diabetes, seizures, cardiopulmonary disorders, gastrointestinal disorders.  All other systems negative.    PAST MEDICAL HISTORY  Past Medical History:   Diagnosis Date   • Herpes 2019       FAMILY HISTORY  Family History   Problem Relation Age of Onset   • Cancer Father    • Cancer Maternal Grandmother 70       SOCIAL HISTORY  Uses vape nicotine products    SURGICAL HISTORY  History reviewed. No pertinent surgical history.    CURRENT MEDICATIONS  Home Medications     Reviewed by Parvez Samuel R.N. (Registered Nurse) on 12/11/21 at 1326  Med List Status: Not Addressed   Medication Last Dose Status   cyclobenzaprine (FLEXERIL) 5 mg tablet  Active   methylPREDNISolone (MEDROL DOSEPAK) 4 MG Tablet Therapy Pack  Active                ALLERGIES  Allergies   Allergen Reactions   • Amoxicillin    • Pcn [Penicillins]        PHYSICAL EXAM  VITAL SIGNS: /98   Pulse 80   Temp 36.9 °C (98.5 °F) (Temporal)   Resp 16   Ht 1.626 m (5' 4\")   Wt 53 kg (116 lb 13.5 oz)   LMP 11/27/2021 " (Approximate)   SpO2 96%   BMI 20.06 kg/m²    Constitutional: 36-year-old female, well developed, Well nourished, No acute distress, Non-toxic appearance.   HENT: Normocephalic, Atraumatic, Nares:Clear, Oropharynx: moist, well hydrated, posterior pharynx:clear   Eyes: PERRL, EOMI, Conjunctiva normal, No discharge.   Neck: Normal range of motion, No tenderness, Supple, No stridor.   Lymphatic: No lymphadenopathy noted.   Cardiovascular: Regular rate and rhythm without mumurs, gallups, rubs   Thorax & Lungs: Normal Equal breath sounds, No respiratory distress, No wheezing, no stridor, no rales. No chest tenderness.   Abdomen: Soft, nontender, nondistended, no organomegaly, positive bowel sounds normal in quality. No guarding or rebound.  Skin: Good skin turgor, pink, warm, dry. No rashes, petechiae, purpura. Normal capillary refill.   Back: No tenderness, No CVA tenderness.   Extremities: Intact distal pulses, No edema, No tenderness, No cyanosis,  Vascular: Pulses are 2+, symmetric in the upper and lower extremities.  Musculoskeletal: Good range of motion in all major joints. No tenderness to palpation or major deformities noted.   Neurologic: Alert & oriented x 3,  No gross focal deficits noted.   Psychiatric: Affect normal, Judgment normal, Mood normal.       RADIOLOGY/PROCEDURES  DX-CHEST-PORTABLE (1 VIEW)   Final Result      1.  There is no acute cardiopulmonary process.            COURSE & MEDICAL DECISION MAKING  Pertinent Labs & Imaging studies reviewed. (See chart for details)  Laboratory studies: Covid negative    Discussion: At this time, the patient presents with 3 weeks of persistent upper respiratory symptoms.  The patient is having worsening cough and yellow sputum production.  Chest x-ray shows no evidence of acute pneumonia.  I discussed the clinical findings with the patient.  A treatment plan was discussed and will be initiated.  Patient indicates she is comfortable with this explanation  disposition.    FINAL IMPRESSION  1. Bronchitis        PLAN  1.  Appropriate discharge instructions given  2.  Azithromycin, Z-Elmo  3.  Follow-up with primary care    Electronically signed by: Guy G Gansert, M.D., 12/11/2021 2:19 PM

## 2021-12-12 NOTE — ED NOTES
Discharged to home with instructions to follow up with her doctor as needed. Zithromax prescription given to patient.

## 2021-12-20 ENCOUNTER — APPOINTMENT (OUTPATIENT)
Dept: RADIOLOGY | Facility: IMAGING CENTER | Age: 36
End: 2021-12-20
Attending: NURSE PRACTITIONER
Payer: COMMERCIAL

## 2021-12-20 ENCOUNTER — HOSPITAL ENCOUNTER (OUTPATIENT)
Facility: MEDICAL CENTER | Age: 36
End: 2021-12-20
Attending: NURSE PRACTITIONER
Payer: COMMERCIAL

## 2021-12-20 ENCOUNTER — OFFICE VISIT (OUTPATIENT)
Dept: URGENT CARE | Facility: CLINIC | Age: 36
End: 2021-12-20
Payer: COMMERCIAL

## 2021-12-20 VITALS
WEIGHT: 119 LBS | TEMPERATURE: 98 F | DIASTOLIC BLOOD PRESSURE: 70 MMHG | SYSTOLIC BLOOD PRESSURE: 116 MMHG | HEART RATE: 82 BPM | RESPIRATION RATE: 18 BRPM | OXYGEN SATURATION: 97 % | BODY MASS INDEX: 20.32 KG/M2 | HEIGHT: 64 IN

## 2021-12-20 DIAGNOSIS — J06.9 URI, ACUTE: ICD-10-CM

## 2021-12-20 DIAGNOSIS — R05.9 COUGH: ICD-10-CM

## 2021-12-20 LAB
EXTERNAL QUALITY CONTROL: NORMAL
FLUAV+FLUBV AG SPEC QL IA: NEGATIVE
INT CON NEG: NEGATIVE
INT CON NEG: NEGATIVE
INT CON POS: POSITIVE
INT CON POS: POSITIVE
S PYO AG THROAT QL: POSITIVE
SARS-COV+SARS-COV-2 AG RESP QL IA.RAPID: NEGATIVE

## 2021-12-20 PROCEDURE — U0005 INFEC AGEN DETEC AMPLI PROBE: HCPCS

## 2021-12-20 PROCEDURE — 71046 X-RAY EXAM CHEST 2 VIEWS: CPT | Mod: TC,FY | Performed by: RADIOLOGY

## 2021-12-20 PROCEDURE — 87880 STREP A ASSAY W/OPTIC: CPT | Performed by: NURSE PRACTITIONER

## 2021-12-20 PROCEDURE — 87426 SARSCOV CORONAVIRUS AG IA: CPT | Performed by: NURSE PRACTITIONER

## 2021-12-20 PROCEDURE — 87070 CULTURE OTHR SPECIMN AEROBIC: CPT

## 2021-12-20 PROCEDURE — 87804 INFLUENZA ASSAY W/OPTIC: CPT | Performed by: NURSE PRACTITIONER

## 2021-12-20 PROCEDURE — 99214 OFFICE O/P EST MOD 30 MIN: CPT | Performed by: NURSE PRACTITIONER

## 2021-12-20 PROCEDURE — U0003 INFECTIOUS AGENT DETECTION BY NUCLEIC ACID (DNA OR RNA); SEVERE ACUTE RESPIRATORY SYNDROME CORONAVIRUS 2 (SARS-COV-2) (CORONAVIRUS DISEASE [COVID-19]), AMPLIFIED PROBE TECHNIQUE, MAKING USE OF HIGH THROUGHPUT TECHNOLOGIES AS DESCRIBED BY CMS-2020-01-R: HCPCS

## 2021-12-20 RX ORDER — ALBUTEROL SULFATE 90 UG/1
2 AEROSOL, METERED RESPIRATORY (INHALATION) EVERY 6 HOURS PRN
Qty: 8.5 G | Refills: 0 | Status: SHIPPED | OUTPATIENT
Start: 2021-12-20 | End: 2022-03-03 | Stop reason: SDUPTHER

## 2021-12-20 RX ORDER — GUAIFENESIN 600 MG/1
600 TABLET, EXTENDED RELEASE ORAL EVERY 12 HOURS
COMMUNITY
End: 2022-06-21

## 2021-12-20 RX ORDER — BENZONATATE 200 MG/1
200 CAPSULE ORAL 3 TIMES DAILY PRN
Qty: 60 CAPSULE | Refills: 0 | Status: SHIPPED
Start: 2021-12-20 | End: 2022-03-03

## 2021-12-20 ASSESSMENT — ENCOUNTER SYMPTOMS
COUGH: 1
CHILLS: 1

## 2021-12-20 ASSESSMENT — FIBROSIS 4 INDEX: FIB4 SCORE: .6875

## 2021-12-21 DIAGNOSIS — J06.9 URI, ACUTE: ICD-10-CM

## 2021-12-21 DIAGNOSIS — R05.9 COUGH: ICD-10-CM

## 2021-12-21 LAB
COVID ORDER STATUS COVID19: NORMAL
SARS-COV-2 RNA RESP QL NAA+PROBE: NOTDETECTED
SPECIMEN SOURCE: NORMAL

## 2021-12-21 NOTE — PROGRESS NOTES
Subjective     Jazmine Reinoso is a 36 y.o. female who presents with Cough (x before , last visit: 2021, wet cough, loss of appetitie, sore throat, runny nose, no energy)    Past Medical History:   Diagnosis Date   • Herpes 2019     Social History     Socioeconomic History   • Marital status: Single     Spouse name: Not on file   • Number of children: Not on file   • Years of education: Not on file   • Highest education level: Not on file   Occupational History   • Not on file   Tobacco Use   • Smoking status: Former Smoker     Packs/day: 0.00     Quit date:      Years since quittin.9   • Smokeless tobacco: Never Used   Vaping Use   • Vaping Use: Some days   • Substances: Nicotine   Substance and Sexual Activity   • Alcohol use: Not Currently     Comment: Occasionally   • Drug use: Not Currently   • Sexual activity: Yes     Partners: Female   Other Topics Concern   • Not on file   Social History Narrative   • Not on file     Social Determinants of Health     Financial Resource Strain:    • Difficulty of Paying Living Expenses: Not on file   Food Insecurity:    • Worried About Running Out of Food in the Last Year: Not on file   • Ran Out of Food in the Last Year: Not on file   Transportation Needs:    • Lack of Transportation (Medical): Not on file   • Lack of Transportation (Non-Medical): Not on file   Physical Activity:    • Days of Exercise per Week: Not on file   • Minutes of Exercise per Session: Not on file   Stress:    • Feeling of Stress : Not on file   Social Connections:    • Frequency of Communication with Friends and Family: Not on file   • Frequency of Social Gatherings with Friends and Family: Not on file   • Attends [employer] Services: Not on file   • Active Member of Clubs or Organizations: Not on file   • Attends Club or Organization Meetings: Not on file   • Marital Status: Not on file   Intimate Partner Violence:    • Fear of Current or Ex-Partner: Not on file   •  "Emotionally Abused: Not on file   • Physically Abused: Not on file   • Sexually Abused: Not on file   Housing Stability:    • Unable to Pay for Housing in the Last Year: Not on file   • Number of Places Lived in the Last Year: Not on file   • Unstable Housing in the Last Year: Not on file     Family History   Problem Relation Age of Onset   • Cancer Father    • Cancer Maternal Grandmother 70       Allergies: Amoxicillin and Pcn [penicillins]            Cough  This is a new problem. The current episode started 1 to 4 weeks ago. The problem has been unchanged. The cough is productive of sputum. Associated symptoms include chills. Nothing aggravates the symptoms. She has tried nothing for the symptoms. The treatment provided no relief.       Review of Systems   Constitutional: Positive for chills and malaise/fatigue.   Respiratory: Positive for cough.    All other systems reviewed and are negative.             Objective     /70 (BP Location: Left arm, Patient Position: Sitting, BP Cuff Size: Adult)   Pulse 82   Temp 36.7 °C (98 °F) (Temporal)   Resp 18   Ht 1.626 m (5' 4\")   Wt 54 kg (119 lb)   LMP 11/27/2021 (Approximate)   SpO2 97%   Breastfeeding No   BMI 20.43 kg/m²      Physical Exam  Vitals reviewed.   Constitutional:       Appearance: Normal appearance.   HENT:      Head: Normocephalic and atraumatic.      Right Ear: Tympanic membrane, ear canal and external ear normal.      Left Ear: Tympanic membrane, ear canal and external ear normal.      Nose: Nose normal.      Mouth/Throat:      Mouth: Mucous membranes are moist.   Eyes:      Extraocular Movements: Extraocular movements intact.      Conjunctiva/sclera: Conjunctivae normal.      Pupils: Pupils are equal, round, and reactive to light.   Cardiovascular:      Rate and Rhythm: Normal rate and regular rhythm.   Pulmonary:      Effort: Pulmonary effort is normal.      Breath sounds: Normal breath sounds.   Musculoskeletal:         General: Normal " range of motion.      Cervical back: Normal range of motion and neck supple.   Skin:     General: Skin is warm.      Capillary Refill: Capillary refill takes less than 2 seconds.   Neurological:      Mental Status: She is alert and oriented to person, place, and time.   Psychiatric:         Mood and Affect: Mood normal.         Behavior: Behavior normal.       12/20/2021 4:20 PM     HISTORY/REASON FOR EXAM:  Cough.        TECHNIQUE/EXAM DESCRIPTION AND NUMBER OF VIEWS:  Two views of the chest.     COMPARISON:  1 view chest 12/11/2021     FINDINGS:  LUNGS: The lungs are clear.     HEART and MEDIASTINUM: normal in size.     Pleura: There are no pleural effusion or pneumothoraces.     Osseous structures: No significant bony abnormality.        IMPRESSION:     Negative two views of the chest.         poct covid: negative     poct influenza: negative     poct strep: positive        Patient was seen in the ER on 12/11.  She just finished a course of Zithromax on 12/16.  This is probably not yet been completely metabolized.  I am skeptical of the rapid strep positive being a true positive.  I discussed this with the patient and advised her I would like to obtain a throat culture at this time.  Patient is agreeable.      Assessment & Plan   URI  Sore throat  Strep positive     Throat culture  Albuterol  Tessalon PRN cough  Will notify with results  Follow up otherwise for perisistnet symptoms         There are no diagnoses linked to this encounter.

## 2021-12-21 NOTE — PATIENT INSTRUCTIONS
"At the store:  Flonase- nasal spray  Sudafed- decongestant         Upper Respiratory Infection, Adult  An upper respiratory infection (URI) affects the nose, throat, and upper air passages. URIs are caused by germs (viruses). The most common type of URI is often called \"the common cold.\"  Medicines cannot cure URIs, but you can do things at home to relieve your symptoms. URIs usually get better within 7-10 days.  Follow these instructions at home:  Activity  · Rest as needed.  · If you have a fever, stay home from work or school until your fever is gone, or until your doctor says you may return to work or school.  ? You should stay home until you cannot spread the infection anymore (you are not contagious).  ? Your doctor may have you wear a face mask so you have less risk of spreading the infection.  Relieving symptoms  · Gargle with a salt-water mixture 3-4 times a day or as needed. To make a salt-water mixture, completely dissolve ½-1 tsp of salt in 1 cup of warm water.  · Use a cool-mist humidifier to add moisture to the air. This can help you breathe more easily.  Eating and drinking    · Drink enough fluid to keep your pee (urine) pale yellow.  · Eat soups and other clear broths.  General instructions    · Take over-the-counter and prescription medicines only as told by your doctor. These include cold medicines, fever reducers, and cough suppressants.  · Do not use any products that contain nicotine or tobacco. These include cigarettes and e-cigarettes. If you need help quitting, ask your doctor.  · Avoid being where people are smoking (avoid secondhand smoke).  · Make sure you get regular shots and get the flu shot every year.  · Keep all follow-up visits as told by your doctor. This is important.  How to avoid spreading infection to others    · Wash your hands often with soap and water. If you do not have soap and water, use hand .  · Avoid touching your mouth, face, eyes, or nose.  · Cough or " "sneeze into a tissue or your sleeve or elbow. Do not cough or sneeze into your hand or into the air.  Contact a doctor if:  · You are getting worse, not better.  · You have any of these:  ? A fever.  ? Chills.  ? Brown or red mucus in your nose.  ? Yellow or brown fluid (discharge)coming from your nose.  ? Pain in your face, especially when you bend forward.  ? Swollen neck glands.  ? Pain with swallowing.  ? White areas in the back of your throat.  Get help right away if:  · You have shortness of breath that gets worse.  · You have very bad or constant:  ? Headache.  ? Ear pain.  ? Pain in your forehead, behind your eyes, and over your cheekbones (sinus pain).  ? Chest pain.  · You have long-lasting (chronic) lung disease along with any of these:  ? Wheezing.  ? Long-lasting cough.  ? Coughing up blood.  ? A change in your usual mucus.  · You have a stiff neck.  · You have changes in your:  ? Vision.  ? Hearing.  ? Thinking.  ? Mood.  Summary  · An upper respiratory infection (URI) is caused by a germ called a virus. The most common type of URI is often called \"the common cold.\"  · URIs usually get better within 7-10 days.  · Take over-the-counter and prescription medicines only as told by your doctor.  This information is not intended to replace advice given to you by your health care provider. Make sure you discuss any questions you have with your health care provider.  Document Released: 06/05/2009 Document Revised: 12/26/2019 Document Reviewed: 08/10/2018  Elsevier Patient Education © 2020 [employer]    "

## 2021-12-23 LAB
BACTERIA SPEC RESP CULT: NORMAL
SIGNIFICANT IND 70042: NORMAL
SITE SITE: NORMAL
SOURCE SOURCE: NORMAL

## 2022-01-18 DIAGNOSIS — A60.00 HERPES SIMPLEX INFECTION OF GENITOURINARY SYSTEM: ICD-10-CM

## 2022-01-18 RX ORDER — VALACYCLOVIR HYDROCHLORIDE 500 MG/1
TABLET, FILM COATED ORAL
Qty: 18 TABLET | Refills: 1 | Status: SHIPPED | OUTPATIENT
Start: 2022-01-18 | End: 2022-03-03 | Stop reason: SDUPTHER

## 2022-02-21 ENCOUNTER — HOSPITAL ENCOUNTER (EMERGENCY)
Facility: MEDICAL CENTER | Age: 37
End: 2022-02-21
Attending: EMERGENCY MEDICINE
Payer: COMMERCIAL

## 2022-02-21 VITALS
DIASTOLIC BLOOD PRESSURE: 99 MMHG | SYSTOLIC BLOOD PRESSURE: 148 MMHG | OXYGEN SATURATION: 98 % | BODY MASS INDEX: 20.47 KG/M2 | TEMPERATURE: 98.1 F | HEIGHT: 64 IN | HEART RATE: 79 BPM | RESPIRATION RATE: 18 BRPM | WEIGHT: 119.93 LBS

## 2022-02-21 DIAGNOSIS — S01.01XA LACERATION OF SCALP, INITIAL ENCOUNTER: ICD-10-CM

## 2022-02-21 PROCEDURE — 700101 HCHG RX REV CODE 250

## 2022-02-21 PROCEDURE — 90471 IMMUNIZATION ADMIN: CPT

## 2022-02-21 PROCEDURE — 700111 HCHG RX REV CODE 636 W/ 250 OVERRIDE (IP): Performed by: EMERGENCY MEDICINE

## 2022-02-21 PROCEDURE — 305308 HCHG STAPLER,SKIN,DISP.

## 2022-02-21 PROCEDURE — 304217 HCHG IRRIGATION SYSTEM

## 2022-02-21 PROCEDURE — 304999 HCHG REPAIR-SIMPLE/INTERMED LEVEL 1

## 2022-02-21 PROCEDURE — 90715 TDAP VACCINE 7 YRS/> IM: CPT | Performed by: EMERGENCY MEDICINE

## 2022-02-21 PROCEDURE — 99282 EMERGENCY DEPT VISIT SF MDM: CPT

## 2022-02-21 RX ORDER — LIDOCAINE HYDROCHLORIDE AND EPINEPHRINE BITARTRATE 20; .01 MG/ML; MG/ML
INJECTION, SOLUTION SUBCUTANEOUS
Status: COMPLETED
Start: 2022-02-21 | End: 2022-02-21

## 2022-02-21 RX ORDER — LIDOCAINE HYDROCHLORIDE AND EPINEPHRINE 10; 10 MG/ML; UG/ML
10 INJECTION, SOLUTION INFILTRATION; PERINEURAL ONCE
Status: DISCONTINUED | OUTPATIENT
Start: 2022-02-21 | End: 2022-02-21

## 2022-02-21 RX ORDER — LIDOCAINE HYDROCHLORIDE AND EPINEPHRINE BITARTRATE 20; .01 MG/ML; MG/ML
20 INJECTION, SOLUTION SUBCUTANEOUS ONCE
Status: DISCONTINUED | OUTPATIENT
Start: 2022-02-21 | End: 2022-02-21

## 2022-02-21 RX ORDER — LIDOCAINE HYDROCHLORIDE AND EPINEPHRINE BITARTRATE 20; .01 MG/ML; MG/ML
10 INJECTION, SOLUTION SUBCUTANEOUS ONCE
Status: COMPLETED | OUTPATIENT
Start: 2022-02-21 | End: 2022-02-21

## 2022-02-21 RX ADMIN — LIDOCAINE HYDROCHLORIDE AND EPINEPHRINE 10 ML: 20; 10 INJECTION, SOLUTION INFILTRATION; PERINEURAL at 16:45

## 2022-02-21 RX ADMIN — LIDOCAINE HYDROCHLORIDE AND EPINEPHRINE BITARTRATE 10 ML: 20; .01 INJECTION, SOLUTION SUBCUTANEOUS at 16:45

## 2022-02-21 RX ADMIN — CLOSTRIDIUM TETANI TOXOID ANTIGEN (FORMALDEHYDE INACTIVATED), CORYNEBACTERIUM DIPHTHERIAE TOXOID ANTIGEN (FORMALDEHYDE INACTIVATED), BORDETELLA PERTUSSIS TOXOID ANTIGEN (GLUTARALDEHYDE INACTIVATED), BORDETELLA PERTUSSIS FILAMENTOUS HEMAGGLUTININ ANTIGEN (FORMALDEHYDE INACTIVATED), BORDETELLA PERTUSSIS PERTACTIN ANTIGEN, AND BORDETELLA PERTUSSIS FIMBRIAE 2/3 ANTIGEN 0.5 ML: 5; 2; 2.5; 5; 3; 5 INJECTION, SUSPENSION INTRAMUSCULAR at 17:17

## 2022-02-21 ASSESSMENT — LIFESTYLE VARIABLES: DO YOU DRINK ALCOHOL: NO

## 2022-02-21 NOTE — ED TRIAGE NOTES
Jazmine Reinoso  36 y.o. female  Chief Complaint   Patient presents with   • Head Laceration     Walked into a metal arrow on the side of the road at 1330. 1-2 cm laceration on right parietal head. Coagulated wound. GCS 15, -Thinners, -LOC, tetanus >5 years.      Pt ambulatory to triage with steady gait for above complaint.     Pt is alert and oriented, speaking in full sentences, follows commands and responds appropriately to questions. Resp are even and unlabored. No behavioral indicators of pain.     Pt placed in lobby. Pt educated on triage process. Pt encouraged to alert staff for any changes. This RN masked and in appropriate PPE during encounter.     Vitals:    02/21/22 1516   BP: 159/100   Pulse: 77   Resp: 16   Temp: 36.5 °C (97.7 °F)   SpO2: 99%

## 2022-02-22 NOTE — DISCHARGE INSTRUCTIONS
Staples out in 10 days.  Return for redness swelling or discharge or should you have any progressive headache vomiting or neurologic symptoms such as trouble with speech vision etc.

## 2022-02-22 NOTE — ED PROVIDER NOTES
CHIEF COMPLAINT  Chief Complaint   Patient presents with   • Head Laceration     Walked into a metal arrow on the side of the road at 1330. 1-2 cm laceration on right parietal head. Coagulated wound. GCS 15, -Thinners, -LOC, tetanus >5 years.        HPI  Jazmine Reinoso is a 36 y.o. female who presents with a laceration to her scalp.  [place] a metal arrow which cut her in the right parietal area.  She notes no nausea vomiting, no significant headache.  No loss of consciousness.  No numbness or weakness in her arms or legs.  No neck pain.    REVIEW OF SYSTEMS  No loss of consciousness, no vomiting    PAST MEDICAL HISTORY  Past Medical History:   Diagnosis Date   • Herpes 2019       FAMILY HISTORY  Family History   Problem Relation Age of Onset   • Cancer Father    • Cancer Maternal Grandmother 70       SOCIAL HISTORY  Social History     Socioeconomic History   • Marital status: Single   Tobacco Use   • Smoking status: Former Smoker     Packs/day: 0.00     Quit date:      Years since quittin.1   • Smokeless tobacco: Never Used   Vaping Use   • Vaping Use: Some days   • Substances: Nicotine   Substance and Sexual Activity   • Alcohol use: Not Currently     Comment: Occasionally   • Drug use: Yes     Types: Inhaled     Comment: marijuana   • Sexual activity: Yes     Partners: Female       SURGICAL HISTORY  History reviewed. No pertinent surgical history.    CURRENT MEDICATIONS  Home Medications     Reviewed by Willie Darby R.N. (Registered Nurse) on 22 at 1527  Med List Status: Partial   Medication Last Dose Status   albuterol 108 (90 Base) MCG/ACT Aero Soln inhalation aerosol  Active   benzonatate (TESSALON) 200 MG capsule  Active   guaiFENesin ER (MUCINEX) 600 MG TABLET SR 12 HR  Active   valACYclovir (VALTREX) 500 MG Tab  Active                ALLERGIES  Allergies   Allergen Reactions   • Amoxicillin    • Pcn [Penicillins]        PHYSICAL  "EXAM  VITAL SIGNS: /100   Pulse 77   Temp 36.5 °C (97.7 °F) (Temporal)   Resp 16   Ht 1.626 m (5' 4\")   Wt 54.4 kg (119 lb 14.9 oz)   LMP 02/18/2022 (Exact Date)   SpO2 99%   BMI 20.59 kg/m²      Constitutional: Well developed, Well nourished, No acute distress, Non-toxic appearance.   HENT: Normocephalic, there is a 2 cm laceration at the scalp in the right parietal region, no evidence of galea penetration, C-spine nontender  Cardiovascular: Regular pulse  Lungs: No respiratory distress  Skin: Warm, Dry, no rash  Extremities: No edema  Neurologic: Alert, appropriate, follows commands, moving all extremities, no drift, no hemineglect, speech normal, cranial nerves intact, visual fields intact, finger-nose intact  Psychiatric: Affect normal    RADIOLOGY/PROCEDURES  Laceration repair-the area was anesthetized with lidocaine with epinephrine.  About 3 cc was used.  Wound was explored to its base in a bloodless field-no evidence of foreign body.  After this the area was copiously irrigated with saline.  3 staples were placed.  No complications.    COURSE & MEDICAL DECISION MAKING  Pertinent Labs & Imaging studies reviewed. (See chart for details)  This is a 32-year-old who presents with a laceration.  There is no clinical evidence of head injury.  I do not feel the patient needs a CT scan of the head.  Area was stapled after irrigation.  Patient will have her tetanus updated and follow-up in 10 days for staple removal.    FINAL IMPRESSION  1.  Scalp laceration  2.   3.         Electronically signed by: Gualberto Moreau M.D., 2/21/2022 4:39 PM        "

## 2022-03-03 ENCOUNTER — OFFICE VISIT (OUTPATIENT)
Dept: MEDICAL GROUP | Facility: MEDICAL CENTER | Age: 37
End: 2022-03-03
Payer: COMMERCIAL

## 2022-03-03 VITALS
HEIGHT: 64 IN | BODY MASS INDEX: 20.32 KG/M2 | WEIGHT: 119 LBS | SYSTOLIC BLOOD PRESSURE: 112 MMHG | OXYGEN SATURATION: 96 % | DIASTOLIC BLOOD PRESSURE: 64 MMHG | TEMPERATURE: 97.7 F | HEART RATE: 77 BPM

## 2022-03-03 DIAGNOSIS — A60.00 HERPES SIMPLEX INFECTION OF GENITOURINARY SYSTEM: ICD-10-CM

## 2022-03-03 DIAGNOSIS — H53.9 VISUAL DISTURBANCE: ICD-10-CM

## 2022-03-03 DIAGNOSIS — J06.9 URI, ACUTE: ICD-10-CM

## 2022-03-03 DIAGNOSIS — R05.9 COUGH: ICD-10-CM

## 2022-03-03 DIAGNOSIS — S09.90XA INJURY OF HEAD, INITIAL ENCOUNTER: ICD-10-CM

## 2022-03-03 DIAGNOSIS — A60.04 HERPES SIMPLEX VULVOVAGINITIS: ICD-10-CM

## 2022-03-03 PROCEDURE — 99214 OFFICE O/P EST MOD 30 MIN: CPT | Performed by: PHYSICIAN ASSISTANT

## 2022-03-03 RX ORDER — VALACYCLOVIR HYDROCHLORIDE 500 MG/1
TABLET, FILM COATED ORAL
Qty: 18 TABLET | Refills: 2 | Status: SHIPPED
Start: 2022-03-03 | End: 2022-06-21

## 2022-03-03 RX ORDER — ALBUTEROL SULFATE 90 UG/1
2 AEROSOL, METERED RESPIRATORY (INHALATION) EVERY 6 HOURS PRN
Qty: 8.5 G | Refills: 0 | Status: SHIPPED | OUTPATIENT
Start: 2022-03-03 | End: 2022-07-26 | Stop reason: SDUPTHER

## 2022-03-03 ASSESSMENT — PATIENT HEALTH QUESTIONNAIRE - PHQ9: CLINICAL INTERPRETATION OF PHQ2 SCORE: 0

## 2022-03-03 NOTE — ASSESSMENT & PLAN NOTE
Pleasant 36-year-old female here.  Has a history of genital herpes.  Gets approximately 2-3 episodes per year.  Uses valacyclovir with good effect.  Needs a refill.  Denies any problems with the medications.  No current outbreak

## 2022-03-03 NOTE — ASSESSMENT & PLAN NOTE
Pleasant 36-year-old comes in for staple removal.  Approximately 10 days ago was in [employer] taking pictures and ran into a metal pole that pierced the right temporal region of her head.  Immediately had significant bleeding.  Went to [facility] and had 3 staples placed.  Needs them removed.  Has not had any problems

## 2022-03-03 NOTE — PROGRESS NOTES
"Subjective:     Chief Complaint   Patient presents with   • Staple Removal     X3 IN PLACE FOR 10 DAYS     Jazmine Reinoso is a 36 y.o. female here today to follow up on:    Genital herpes  Paresh 36-year-old female here.  Has a history of genital herpes.  Gets approximately 2-3 episodes per year.  Uses valacyclovir with good effect.  Needs a refill.  Denies any problems with the medications.  No current outbreak    Head injury  Paresh 36-year-old comes in for staple removal.  Approximately 10 days ago was in [employer] taking pictures and ran into a metal pole that pierced the right temporal region of her head.  Immediately had significant bleeding.  Went to [facility] and had 3 staples placed.  Needs them removed.  Has not had any problems       Current medicines (including changes today)  Current Outpatient Medications   Medication Sig Dispense Refill   • valACYclovir (VALTREX) 500 MG Tab Take 1 pill by mouth twice daily for 3 days 18 Tablet 2   • albuterol 108 (90 Base) MCG/ACT Aero Soln inhalation aerosol Inhale 2 Puffs every 6 hours as needed for Shortness of Breath. 8.5 g 0   • guaiFENesin ER (MUCINEX) 600 MG TABLET SR 12 HR Take 600 mg by mouth every 12 hours. (Patient not taking: Reported on 12/20/2021)       No current facility-administered medications for this visit.     She  has a past medical history of Herpes (2019).    ROS  No chest pain, no abdominal pain, no rash.  All other systems reviewed and are negative      Objective:     /64 (BP Location: Right arm, Patient Position: Sitting, BP Cuff Size: Adult)   Pulse 77   Temp 36.5 °C (97.7 °F) (Temporal)   Ht 1.626 m (5' 4\")   Wt 54 kg (119 lb)   SpO2 96%  Body mass index is 20.43 kg/m².     Physical Exam:  Constitutional: Alert, no distress.  Skin: Warm, dry, good turgor, no rashes in visible areas.  Eye: Equal, round and reactive, conjunctiva clear, lids normal.  ENMT: Lips without lesions, good dentition, oropharynx clear.  Neck: " Trachea midline, no masses, no thyromegaly. No cervical or supraclavicular lymphadenopathy.  Respiratory: Unlabored respiratory effort, lungs clear to auscultation, no wheezes, no ronchi.  Cardiovascular: Normal S1, S2, no murmur, no edema.  Abdomen: Soft, non-tender, no masses, no hepatosplenomegaly.  Psych: Alert and oriented x3, normal affect and mood.  Head: Patient has 3 well-placed sutures at the right frontal temporal region.  They removed easily with staple removal.  No surrounding erythema tenderness wound margins were approximated well and closed      Assessment and Plan:   The following treatment plan was discussed and signs and symptoms for which to return were discussed with patient.  Patient verbalized understanding.    1. Injury of head, initial encounter  New and improving  Remove 3 staples with these    2. Herpes simplex vulvovaginitis  Chronic and stable  Refilled Valtrex    3. Herpes simplex infection of genitourinary system  See above  - valACYclovir (VALTREX) 500 MG Tab; Take 1 pill by mouth twice daily for 3 days  Dispense: 18 Tablet; Refill: 2    4. Cough  Remittent and gets some wheezing when having URI.  Currently asymptomatic but refilled albuterol  - albuterol 108 (90 Base) MCG/ACT Aero Soln inhalation aerosol; Inhale 2 Puffs every 6 hours as needed for Shortness of Breath.  Dispense: 8.5 g; Refill: 0      6. Visual disturbance  Patient mentioned as she was leaving that gets this very rarely and intermittently.  This is new and unstable will order some labs and have her follow-up.  May be atypical migraine  - CBC WITH DIFFERENTIAL; Future  - Comp Metabolic Panel; Future       Followup: No follow-ups on file.         Please note that this dictation was created using voice recognition software. I have made every reasonable attempt to correct obvious errors, but I expect that there are errors of grammar and possibly content that I did not discover before finalizing the note.

## 2022-06-07 ENCOUNTER — OFFICE VISIT (OUTPATIENT)
Dept: MEDICAL GROUP | Facility: MEDICAL CENTER | Age: 37
End: 2022-06-07
Payer: COMMERCIAL

## 2022-06-07 VITALS
BODY MASS INDEX: 20.18 KG/M2 | TEMPERATURE: 97.3 F | OXYGEN SATURATION: 98 % | DIASTOLIC BLOOD PRESSURE: 84 MMHG | WEIGHT: 118.2 LBS | HEART RATE: 85 BPM | HEIGHT: 64 IN | SYSTOLIC BLOOD PRESSURE: 102 MMHG

## 2022-06-07 DIAGNOSIS — J45.20 MILD INTERMITTENT REACTIVE AIRWAY DISEASE WITHOUT COMPLICATION: ICD-10-CM

## 2022-06-07 DIAGNOSIS — R09.1 PLEURISY: ICD-10-CM

## 2022-06-07 DIAGNOSIS — R07.89 CHEST TIGHTNESS: ICD-10-CM

## 2022-06-07 DIAGNOSIS — R05.9 COUGH: ICD-10-CM

## 2022-06-07 PROCEDURE — 99214 OFFICE O/P EST MOD 30 MIN: CPT | Performed by: PHYSICIAN ASSISTANT

## 2022-06-07 RX ORDER — CODEINE PHOSPHATE AND GUAIFENESIN 10; 100 MG/5ML; MG/5ML
SOLUTION ORAL
Qty: 140 ML | Refills: 0 | Status: SHIPPED | OUTPATIENT
Start: 2022-06-07 | End: 2022-06-12

## 2022-06-07 RX ORDER — NAPROXEN 500 MG/1
500 TABLET ORAL 2 TIMES DAILY WITH MEALS
Qty: 30 TABLET | Refills: 0 | Status: SHIPPED
Start: 2022-06-07 | End: 2022-06-21

## 2022-06-07 RX ORDER — CODEINE PHOSPHATE AND GUAIFENESIN 10; 100 MG/5ML; MG/5ML
SOLUTION ORAL
Qty: 140 ML | Refills: 0 | Status: SHIPPED | OUTPATIENT
Start: 2022-06-07 | End: 2022-06-07 | Stop reason: SDUPTHER

## 2022-06-07 RX ORDER — AZITHROMYCIN 250 MG/1
TABLET, FILM COATED ORAL
Qty: 6 TABLET | Refills: 0 | Status: SHIPPED | OUTPATIENT
Start: 2022-06-07 | End: 2023-09-18

## 2022-06-07 NOTE — ASSESSMENT & PLAN NOTE
Patient is a 36-year-old female comes in with cough, worse at night that is productive with sputum.  Denies postnasal drip, sore throat, fever or chills.  States cough started when she was vaping and she has vape for many years.  Her cough seem to get worse so she has stopped vaping now for 8 days.  Denies known sick contacts.  Denies body aches, muscle aches or fatigue.  Was told in the ER that she might have asthma but does not feel like the inhaler helps her.  Reports that in general symptoms of been going on for 2 weeks bad but then intermittently for few months and that is what ultimately drove her to stop vaping

## 2022-06-07 NOTE — PROGRESS NOTES
Subjective:   Jazmine Reinoso is a 36 y.o. female here today for     Cough  Patient is a 36-year-old female comes in with cough, worse at night that is productive with sputum.  Denies postnasal drip, sore throat, fever or chills.  States cough started when she was vaping and she has vape for many years.  Her cough seem to get worse so she has stopped vaping now for 8 days.  Denies known sick contacts.  Denies body aches, muscle aches or fatigue.  Was told in the ER that she might have asthma but does not feel like the inhaler helps her.  Reports that in general symptoms of been going on for 2 weeks bad but then intermittently for few months and that is what ultimately drove her to stop vaping         Current medicines (including changes today)  Current Outpatient Medications   Medication Sig Dispense Refill   • naproxen (NAPROSYN) 500 MG Tab Take 1 Tablet by mouth 2 times a day with meals. 30 Tablet 0   • azithromycin (ZITHROMAX) 250 MG Tab Take two tablets on day one, then on tablet the following four days 6 Tablet 0   • guaifenesin-codeine (CHERATUSSIN AC) Solution oral solution Take one teaspoon every six hours as needed for cough for five days 140 mL 0   • valACYclovir (VALTREX) 500 MG Tab Take 1 pill by mouth twice daily for 3 days 18 Tablet 2   • albuterol 108 (90 Base) MCG/ACT Aero Soln inhalation aerosol Inhale 2 Puffs every 6 hours as needed for Shortness of Breath. 8.5 g 0   • guaiFENesin ER (MUCINEX) 600 MG TABLET SR 12 HR Take 600 mg by mouth every 12 hours. (Patient not taking: Reported on 12/20/2021)       No current facility-administered medications for this visit.     She  has a past medical history of Herpes (2019).  Amoxicillin and Pcn [penicillins]     Social History and Family History were reviewed and updated.    ROS   No headaches, chest pain, no shortness of breath, abdominal pain, nausea, or vomiting.  All other systems were reviewed and are negative or noted as positive in the HPI.        "Objective:     /84 (BP Location: Right arm, Patient Position: Sitting, BP Cuff Size: Adult)   Pulse 85   Temp 36.3 °C (97.3 °F) (Temporal)   Ht 1.626 m (5' 4\")   Wt 53.6 kg (118 lb 3.2 oz)   SpO2 98%  Body mass index is 20.29 kg/m².     Physical Exam:  Constitutional: ANO x3, no acute distress, well-nourished, well-hydrated   Skin: Warm, dry, good turgor, no rashes in visible areas.  HEENT: Is normocephalic atraumatic, good PERRLA, extraocular movements intact, TMs and oropharynx are clear with good dentition   Neck: Soft and supple, trachea midline, no masses.  No thyroid megaly or cervical lymphadenopathy noted  Cardiovascular: Regular rate and rhythm.  Normal S1 and 2, no murmurs, rubs or gallops.  No edema noted  Lungs: Clear to auscultation bilaterally.  No wheezes, rales or rhonchi.  Good inspiratory and expiratory breath sounds  Abdomen: Soft, non-tender, no masses.  No hepatosplenomegaly.  Negative Ludwig's  Psych: Alert and oriented x3, normal affect and mood.  Neuro: Cranial nerves II through XII were assessed and intact.  Normal gait, normal cerebellar function noted  Musculoskeletal: Full range of motion, good strength against resistance    Clinical Course/Lab Analysis:        Assessment and Plan:   The following treatment plan was discussed.  Signs and symptoms for which to return were discussed with patient at length.  Patient verbalized understanding.    1. Cough  - DX-CHEST-2 VIEWS; Future  - PULMONARY FUNCTION TESTS -Test requested: Spirometry with-out & with Bronchodilator; Future  - guaifenesin-codeine (CHERATUSSIN AC) Solution oral solution; Take one teaspoon every six hours as needed for cough for five days  Dispense: 140 mL; Refill: 0    2. Chest tightness  - DX-CHEST-2 VIEWS; Future  - PULMONARY FUNCTION TESTS -Test requested: Spirometry with-out & with Bronchodilator; Future  - guaifenesin-codeine (CHERATUSSIN AC) Solution oral solution; Take one teaspoon every six hours as needed " for cough for five days  Dispense: 140 mL; Refill: 0    3. Pleurisy  - naproxen (NAPROSYN) 500 MG Tab; Take 1 Tablet by mouth 2 times a day with meals.  Dispense: 30 Tablet; Refill: 0    4. Mild intermittent reactive airway disease without complication  - PULMONARY FUNCTION TESTS -Test requested: Spirometry with-out & with Bronchodilator; Future    Other orders  - azithromycin (ZITHROMAX) 250 MG Tab; Take two tablets on day one, then on tablet the following four days  Dispense: 6 Tablet; Refill: 0       These are all new conditions that are unstable  I do feel that vaping is contributed to pleurisy but had like to get an x-ray to check underlying lung status.  Take naproxen for this.  Given the duration of symptoms I Debra cover her with a macrolide antibiotic and discussed with her to take cough medicine sparingly at night. PDMP    She is  Followup: 1 week if symptoms are worsening    Please note that this dictation was created using voice recognition software. I have made every reasonable attempt to correct obvious errors, but I expect that there are errors of grammar and possibly content that I did not discover before finalizing the note.

## 2022-06-21 ENCOUNTER — HOSPITAL ENCOUNTER (EMERGENCY)
Facility: MEDICAL CENTER | Age: 37
End: 2022-06-21
Attending: EMERGENCY MEDICINE
Payer: COMMERCIAL

## 2022-06-21 ENCOUNTER — APPOINTMENT (OUTPATIENT)
Dept: RADIOLOGY | Facility: MEDICAL CENTER | Age: 37
End: 2022-06-21
Attending: EMERGENCY MEDICINE
Payer: COMMERCIAL

## 2022-06-21 VITALS
DIASTOLIC BLOOD PRESSURE: 90 MMHG | SYSTOLIC BLOOD PRESSURE: 134 MMHG | OXYGEN SATURATION: 96 % | HEIGHT: 64 IN | TEMPERATURE: 99.6 F | WEIGHT: 115.96 LBS | BODY MASS INDEX: 19.8 KG/M2 | HEART RATE: 88 BPM | RESPIRATION RATE: 15 BRPM

## 2022-06-21 DIAGNOSIS — R55 NEAR SYNCOPE: ICD-10-CM

## 2022-06-21 DIAGNOSIS — R42 DIZZINESS: ICD-10-CM

## 2022-06-21 LAB
ALBUMIN SERPL BCP-MCNC: 4.8 G/DL (ref 3.2–4.9)
ALBUMIN/GLOB SERPL: 1.7 G/DL
ALP SERPL-CCNC: 57 U/L (ref 30–99)
ALT SERPL-CCNC: 13 U/L (ref 2–50)
ANION GAP SERPL CALC-SCNC: 13 MMOL/L (ref 7–16)
APPEARANCE UR: CLEAR
AST SERPL-CCNC: 22 U/L (ref 12–45)
BACTERIA #/AREA URNS HPF: ABNORMAL /HPF
BASOPHILS # BLD AUTO: 0.6 % (ref 0–1.8)
BASOPHILS # BLD: 0.06 K/UL (ref 0–0.12)
BILIRUB SERPL-MCNC: 0.2 MG/DL (ref 0.1–1.5)
BILIRUB UR QL STRIP.AUTO: NEGATIVE
BUN SERPL-MCNC: 10 MG/DL (ref 8–22)
CALCIUM SERPL-MCNC: 9.4 MG/DL (ref 8.4–10.2)
CHLORIDE SERPL-SCNC: 99 MMOL/L (ref 96–112)
CO2 SERPL-SCNC: 24 MMOL/L (ref 20–33)
COLOR UR: YELLOW
CREAT SERPL-MCNC: 0.69 MG/DL (ref 0.5–1.4)
EKG IMPRESSION: NORMAL
EOSINOPHIL # BLD AUTO: 0.33 K/UL (ref 0–0.51)
EOSINOPHIL NFR BLD: 3.2 % (ref 0–6.9)
EPI CELLS #/AREA URNS HPF: ABNORMAL /HPF
ERYTHROCYTE [DISTWIDTH] IN BLOOD BY AUTOMATED COUNT: 42.4 FL (ref 35.9–50)
GFR SERPLBLD CREATININE-BSD FMLA CKD-EPI: 116 ML/MIN/1.73 M 2
GLOBULIN SER CALC-MCNC: 2.8 G/DL (ref 1.9–3.5)
GLUCOSE BLD STRIP.AUTO-MCNC: 79 MG/DL (ref 65–99)
GLUCOSE SERPL-MCNC: 110 MG/DL (ref 65–99)
GLUCOSE UR STRIP.AUTO-MCNC: NEGATIVE MG/DL
HCG SERPL QL: NEGATIVE
HCT VFR BLD AUTO: 44.6 % (ref 37–47)
HGB BLD-MCNC: 15.4 G/DL (ref 12–16)
IMM GRANULOCYTES # BLD AUTO: 0.03 K/UL (ref 0–0.11)
IMM GRANULOCYTES NFR BLD AUTO: 0.3 % (ref 0–0.9)
KETONES UR STRIP.AUTO-MCNC: NEGATIVE MG/DL
LEUKOCYTE ESTERASE UR QL STRIP.AUTO: ABNORMAL
LYMPHOCYTES # BLD AUTO: 1.67 K/UL (ref 1–4.8)
LYMPHOCYTES NFR BLD: 16 % (ref 22–41)
MCH RBC QN AUTO: 33.6 PG (ref 27–33)
MCHC RBC AUTO-ENTMCNC: 34.5 G/DL (ref 33.6–35)
MCV RBC AUTO: 97.2 FL (ref 81.4–97.8)
MICRO URNS: ABNORMAL
MONOCYTES # BLD AUTO: 0.49 K/UL (ref 0–0.85)
MONOCYTES NFR BLD AUTO: 4.7 % (ref 0–13.4)
MUCOUS THREADS #/AREA URNS HPF: ABNORMAL /HPF
NEUTROPHILS # BLD AUTO: 7.83 K/UL (ref 2–7.15)
NEUTROPHILS NFR BLD: 75.2 % (ref 44–72)
NITRITE UR QL STRIP.AUTO: NEGATIVE
NRBC # BLD AUTO: 0 K/UL
NRBC BLD-RTO: 0 /100 WBC
PH UR STRIP.AUTO: 6.5 [PH] (ref 5–8)
PLATELET # BLD AUTO: 260 K/UL (ref 164–446)
PMV BLD AUTO: 10.5 FL (ref 9–12.9)
POTASSIUM SERPL-SCNC: 4.2 MMOL/L (ref 3.6–5.5)
PROT SERPL-MCNC: 7.6 G/DL (ref 6–8.2)
PROT UR QL STRIP: NEGATIVE MG/DL
RBC # BLD AUTO: 4.59 M/UL (ref 4.2–5.4)
RBC # URNS HPF: ABNORMAL /HPF
RBC UR QL AUTO: NEGATIVE
SODIUM SERPL-SCNC: 136 MMOL/L (ref 135–145)
SP GR UR STRIP.AUTO: 1.01
TROPONIN T SERPL-MCNC: <6 NG/L (ref 6–19)
WBC # BLD AUTO: 10.4 K/UL (ref 4.8–10.8)
WBC #/AREA URNS HPF: ABNORMAL /HPF

## 2022-06-21 PROCEDURE — 81001 URINALYSIS AUTO W/SCOPE: CPT

## 2022-06-21 PROCEDURE — 93005 ELECTROCARDIOGRAM TRACING: CPT | Performed by: EMERGENCY MEDICINE

## 2022-06-21 PROCEDURE — 71045 X-RAY EXAM CHEST 1 VIEW: CPT

## 2022-06-21 PROCEDURE — 82962 GLUCOSE BLOOD TEST: CPT

## 2022-06-21 PROCEDURE — 84484 ASSAY OF TROPONIN QUANT: CPT

## 2022-06-21 PROCEDURE — 70450 CT HEAD/BRAIN W/O DYE: CPT

## 2022-06-21 PROCEDURE — 87086 URINE CULTURE/COLONY COUNT: CPT

## 2022-06-21 PROCEDURE — 84703 CHORIONIC GONADOTROPIN ASSAY: CPT

## 2022-06-21 PROCEDURE — 93005 ELECTROCARDIOGRAM TRACING: CPT

## 2022-06-21 PROCEDURE — 85025 COMPLETE CBC W/AUTO DIFF WBC: CPT

## 2022-06-21 PROCEDURE — 80053 COMPREHEN METABOLIC PANEL: CPT

## 2022-06-21 PROCEDURE — 99284 EMERGENCY DEPT VISIT MOD MDM: CPT

## 2022-06-21 PROCEDURE — 36415 COLL VENOUS BLD VENIPUNCTURE: CPT

## 2022-06-21 RX ORDER — NAPROXEN 500 MG/1
500 TABLET ORAL 2 TIMES DAILY PRN
Status: SHIPPED | COMMUNITY
End: 2022-07-09

## 2022-06-21 ASSESSMENT — ENCOUNTER SYMPTOMS
CHILLS: 0
SHORTNESS OF BREATH: 0
FEVER: 0
DIZZINESS: 1

## 2022-06-21 NOTE — ED NOTES
Rounded on patient and patient updated that all results completed; pending to be reevaluated by the ER doctor.  Patient verbalizes understanding

## 2022-06-21 NOTE — ED TRIAGE NOTES
"Chief Complaint   Patient presents with   • Altered Mental Status   • Dizziness     35 yo female brought in by partner with reports of patient being altered at work and repetitive.  Patient keeps asking \"where am I why am I here.\"  Patient reports she feels dizzy, reports she ate a granola bar this AM and doesn't recall any events prior to a near syncopal at work.    BP (!) 147/88   Pulse 78   Temp 37 °C (98.6 °F) (Temporal)   Resp 16   Ht 1.626 m (5' 4\")   Wt 52.6 kg (115 lb 15.4 oz)   LMP 06/14/2022 (Approximate)   SpO2 98%   BMI 19.90 kg/m²    Patient is not Covid Vaccinated     FSBS at triage was 79 given juice   "

## 2022-06-21 NOTE — ED PROVIDER NOTES
"ED Provider Note  Primary care provider: Fransisca Carranza P.A.-C.  Means of arrival: private vehicle  History obtained from: patient  History limited by: none    CHIEF COMPLAINT  Chief Complaint   Patient presents with   • Altered Mental Status   • Dizziness     35 yo female brought in by partner with reports of patient being altered at work and repetitive.  Patient keeps asking \"where am I why am I here.\"  Patient reports she feels dizzy, reports she ate a granola bar this AM and doesn't recall any events prior to a near syncopal at work.        HPI  Jazmine Reinoso is a 36 y.o. female who presents to the Emergency Department for evaluation of dizziness and near syncopal event.  Patient was texting her partner this morning that she was feeling unwell, she is feeling somewhat dizzy and lightheaded.  All she had had this morning to eat was a granola bar.  At work she reports that she sat down in the corner and her coworkers found her and she appeared pale and therefore her partner brought her in for further evaluation.  Per  her partner patient was slightly confused and repetitive and not acting quite like herself.  Patient also reports feeling dizzy but this is getting better since being in the emergency department and lying down.  In triage her blood sugar was noted to be  in the 70s.  Patient is otherwise generally healthy and has no underlying medical problems.  She denies chest pain, recent illness, fevers or chills.    REVIEW OF SYSTEMS  Review of Systems   Constitutional: Negative for chills and fever.   Respiratory: Negative for shortness of breath.    Cardiovascular: Negative for chest pain.   Neurological: Positive for dizziness.   All other systems reviewed and are negative.        PAST MEDICAL HISTORY   has a past medical history of Herpes (2019).    SURGICAL HISTORY  patient denies any surgical history    SOCIAL HISTORY  Social History     Tobacco Use   • Smoking status: Former Smoker     Packs/day: " "0.00     Quit date:      Years since quittin.4   • Smokeless tobacco: Never Used   Vaping Use   • Vaping Use: Former   • Substances: Nicotine   Substance Use Topics   • Alcohol use: Not Currently     Comment: Occasionally   • Drug use: Not Currently     Types: Inhaled     Comment: marijuana      Social History     Substance and Sexual Activity   Drug Use Not Currently   • Types: Inhaled    Comment: marijuana       FAMILY HISTORY  Family History   Problem Relation Age of Onset   • Cancer Father    • Cancer Maternal Grandmother 70       CURRENT MEDICATIONS  Home Medications     Reviewed by Jorge Balderas (Pharmacy Tech) on 22 at 1347  Med List Status: Complete   Medication Last Dose Status   albuterol 108 (90 Base) MCG/ACT Aero Soln inhalation aerosol 2022 Active   azithromycin (ZITHROMAX) 250 MG Tab 2022 Active   naproxen (NAPROSYN) 500 MG Tab > 3 DAYS Active                ALLERGIES  Allergies   Allergen Reactions   • Amoxicillin    • Pcn [Penicillins]        PHYSICAL EXAM  VITAL SIGNS: BP (!) 134/90   Pulse 88   Temp 37.6 °C (99.6 °F) (Temporal)   Resp 15   Ht 1.626 m (5' 4\")   Wt 52.6 kg (115 lb 15.4 oz)   LMP 2022 (Approximate)   SpO2 96%   BMI 19.90 kg/m²   Vitals reviewed by myself.  Physical Exam  Nursing note and vitals reviewed.  Constitutional: Well-developed and well-nourished. No distress.   HENT: Head is normocephalic and atraumatic. Oropharynx is clear and moist without exudate or erythema.   Eyes: Pupils are equal, round, and reactive to light. No horizontal or vertical nystagmus. Conjunctiva are normal.   Cardiovascular: Normal rate and regular rhythm. No murmur heard. Normal radial pulses.  Pulmonary/Chest: Breath sounds normal. No wheezes or rales.   Abdominal: Soft and non-tender. No distention.    Musculoskeletal: Extremities exhibit normal range of motion without edema or tenderness.   Neurological: Awake, alert and oriented to person, place, and " time. No focal deficits noted. Cranial nerves II - XII intact. No pronator drift.  No dysmetria on cerebellar testing. Normal speech and language. Normal strength and sensation in bilateral upper and lower extremities.   Skin: Skin is warm and dry. No rash.   Psychiatric: Normal mood and affect. Appropriate for clinical situation.      DIAGNOSTIC STUDIES /  LABS  Labs Reviewed   CBC WITH DIFFERENTIAL - Abnormal; Notable for the following components:       Result Value    MCH 33.6 (*)     Neutrophils-Polys 75.20 (*)     Lymphocytes 16.00 (*)     Neutrophils (Absolute) 7.83 (*)     All other components within normal limits   COMP METABOLIC PANEL - Abnormal; Notable for the following components:    Glucose 110 (*)     All other components within normal limits   URINALYSIS,CULTURE IF INDICATED - Abnormal; Notable for the following components:    Leukocyte Esterase Trace (*)     All other components within normal limits    Narrative:     Indication for culture:->Patient WITHOUT an indwelling Talavera  catheter in place with new onset of Dysuria, Frequency,  Urgency, and/or Suprapubic pain   URINE MICROSCOPIC (W/UA) - Abnormal; Notable for the following components:    Bacteria Moderate (*)     All other components within normal limits    Narrative:     Indication for culture:->Patient WITHOUT an indwelling Talavera  catheter in place with new onset of Dysuria, Frequency,  Urgency, and/or Suprapubic pain   TROPONIN   HCG QUAL SERUM   ESTIMATED GFR   URINE CULTURE-EXISTING-LESS THAN 48 HOURS   POCT GLUCOSE DEVICE RESULTS       All labs reviewed by me.    EKG Interpretation:  Interpreted by myself    12 Lead EKG interpreted by me to show:  EKG at 12:03 PM: Normal sinus rhythm, heart rate 90, normal axis, normal intervals, , QRS 99, QTc 468, no acute ST-T segment changes, no evidence of acute arrhythmia or ischemia  My impression of this EKG: Does not indicate ischemia or arrhythmia at this time.    RADIOLOGY  CT-HEAD W/O    Final Result      No acute intracranial abnormality is identified.      Sinus inflammatory disease      DX-CHEST-PORTABLE (1 VIEW)   Final Result      No evidence of acute cardiopulmonary process.        The radiologist's interpretation of all radiological studies have been reviewed by me.      COURSE & MEDICAL DECISION MAKING  Nursing notes, YUSEF LICONAHx reviewed in chart.    Patient is a 36-year-old female who comes in for evaluation of near syncopal event.  Differential diagnosis includes near syncope, electrolyte derangement, vasovagal episode, transient hypoglycemia, arrhythmia, urinary tract infection.  On arrival blood sugar is within normal limits in the 70s.  Further diagnostic work-up includes CT of the head, labs and EKG.    Patient's initial vitals are within normal limits.  EKG returns and demonstrates no evidence of acute arrhythmia or ischemia.  Labs returned and are unremarkable.  Chest x-ray demonstrates no acute cardiopulmonary processes.  Urinalysis is notable for bacteria, this is likely contaminant as patient is having no dysuria or symptoms of urinary tract infection.  Urinalysis will be sent for culture but she will not be started on treatment as she is not having symptoms.  CT of the head returns and demonstrates no acute intracranial processes.  Therefore patient is reassured and advised on management of near syncope, likely vasovagal event.  She is given strict return precautions and discharged in stable condition.      FINAL IMPRESSION  1. Dizziness    2. Near syncope

## 2022-06-21 NOTE — ED NOTES
[facility] assist patient up to the bathroom using the [employer] and urine sample collected sent to lab.

## 2022-06-24 LAB
BACTERIA UR CULT: NORMAL
SIGNIFICANT IND 70042: NORMAL
SITE SITE: NORMAL
SOURCE SOURCE: NORMAL

## 2022-06-28 ENCOUNTER — OFFICE VISIT (OUTPATIENT)
Dept: MEDICAL GROUP | Facility: MEDICAL CENTER | Age: 37
End: 2022-06-28
Payer: COMMERCIAL

## 2022-06-28 VITALS
DIASTOLIC BLOOD PRESSURE: 82 MMHG | BODY MASS INDEX: 20.14 KG/M2 | TEMPERATURE: 97.1 F | HEIGHT: 64 IN | OXYGEN SATURATION: 97 % | HEART RATE: 92 BPM | SYSTOLIC BLOOD PRESSURE: 136 MMHG | WEIGHT: 118 LBS

## 2022-06-28 DIAGNOSIS — Z13.29 SCREENING FOR THYROID DISORDER: ICD-10-CM

## 2022-06-28 DIAGNOSIS — F32.1 CURRENT MODERATE EPISODE OF MAJOR DEPRESSIVE DISORDER WITHOUT PRIOR EPISODE (HCC): ICD-10-CM

## 2022-06-28 DIAGNOSIS — R79.89 ABNORMAL CBC: ICD-10-CM

## 2022-06-28 PROCEDURE — 99214 OFFICE O/P EST MOD 30 MIN: CPT | Performed by: PHYSICIAN ASSISTANT

## 2022-06-28 RX ORDER — SERTRALINE HYDROCHLORIDE 25 MG/1
25 TABLET, FILM COATED ORAL DAILY
Qty: 30 TABLET | Refills: 11 | Status: SHIPPED
Start: 2022-06-28 | End: 2022-07-01

## 2022-06-28 ASSESSMENT — ANXIETY QUESTIONNAIRES
1. FEELING NERVOUS, ANXIOUS, OR ON EDGE: NEARLY EVERY DAY
2. NOT BEING ABLE TO STOP OR CONTROL WORRYING: NEARLY EVERY DAY
GAD7 TOTAL SCORE: 19
3. WORRYING TOO MUCH ABOUT DIFFERENT THINGS: NEARLY EVERY DAY
4. TROUBLE RELAXING: NEARLY EVERY DAY
5. BEING SO RESTLESS THAT IT IS HARD TO SIT STILL: SEVERAL DAYS
7. FEELING AFRAID AS IF SOMETHING AWFUL MIGHT HAPPEN: NEARLY EVERY DAY
6. BECOMING EASILY ANNOYED OR IRRITABLE: NEARLY EVERY DAY

## 2022-06-28 ASSESSMENT — FIBROSIS 4 INDEX: FIB4 SCORE: 0.8

## 2022-06-28 ASSESSMENT — PATIENT HEALTH QUESTIONNAIRE - PHQ9
5. POOR APPETITE OR OVEREATING: 2 - MORE THAN HALF THE DAYS
SUM OF ALL RESPONSES TO PHQ QUESTIONS 1-9: 20
CLINICAL INTERPRETATION OF PHQ2 SCORE: 6

## 2022-06-28 NOTE — PROGRESS NOTES
Subjective:   Jazmine Reinoso is a 36 y.o. female here today for     Current moderate episode of major depressive disorder without prior episode (HCC)  Patient comes in very tearful and states the last month has been very hard for her.  She was in a long-term relationship with a woman who had a baby.  She states she raised the baby like her own and they broke up after 4 years.  This woman is no longer allowing her to see the child.  She now is in a new relationship but states she is not able to see the child and has been very anxious and tearful.  She does not drink alcohol and has not been smoking any pot.  She cries frequently..  Denies HI/SI       Depression Screening    Little interest or pleasure in doing things?  3 - nearly every day   Feeling down, depressed , or hopeless? 3 - nearly every day   Trouble falling or staying asleep, or sleeping too much?  3 - nearly every day   Feeling tired or having little energy?  3 - nearly every day   Poor appetite or overeating?  2 - more than half the days   Feeling bad about yourself - or that you are a failure or have let yourself or your family down? 3 - nearly every day   Trouble concentrating on things, such as reading the newspaper or watching television? 1 - several days   Moving or speaking so slowly that other people could have noticed.  Or the opposite - being so fidgety or restless that you have been moving around a lot more than usual?  1 - several days   Thoughts that you would be better off dead, or of hurting yourself?  1 - several days   Patient Health Questionnaire Score: 20       If depressive symptoms identified deferred to follow up visit unless specifically addressed in assesment and plan.    Interpretation of PHQ-9 Total Score   Score Severity   1-4 No Depression   5-9 Mild Depression   10-14 Moderate Depression   15-19 Moderately Severe Depression   20-27 Severe Depression        Current medicines (including changes today)  Current Outpatient  "Medications   Medication Sig Dispense Refill   • sertraline (ZOLOFT) 25 MG tablet Take 1 Tablet by mouth every day. 30 Tablet 11   • naproxen (NAPROSYN) 500 MG Tab Take 500 mg by mouth 2 times a day as needed.     • azithromycin (ZITHROMAX) 250 MG Tab Take two tablets on day one, then on tablet the following four days 6 Tablet 0   • albuterol 108 (90 Base) MCG/ACT Aero Soln inhalation aerosol Inhale 2 Puffs every 6 hours as needed for Shortness of Breath. 8.5 g 0     No current facility-administered medications for this visit.     She  has a past medical history of Herpes (2019).  Amoxicillin and Pcn [penicillins]     Social History and Family History were reviewed and updated.    ROS   No headaches, chest pain, no shortness of breath, abdominal pain, nausea, or vomiting.  All other systems were reviewed and are negative or noted as positive in the HPI.       Objective:     /82 (BP Location: Right arm, Patient Position: Sitting, BP Cuff Size: Adult)   Pulse 92   Temp 36.2 °C (97.1 °F) (Temporal)   Ht 1.626 m (5' 4\")   Wt 53.5 kg (118 lb)   SpO2 97%  Body mass index is 20.25 kg/m².     Physical Exam:  Constitutional: ANO x3, no acute distress, well-nourished, well-hydrated   Skin: Warm, dry, good turgor, no rashes in visible areas.  HEENT: Is normocephalic atraumatic, good PERRLA, extraocular movements intact, TMs and oropharynx are clear with good dentition   Neck: Soft and supple, trachea midline, no masses.  No thyroid megaly or cervical lymphadenopathy noted  Cardiovascular: Regular rate and rhythm.  Normal S1 and 2, no murmurs, rubs or gallops.  No edema noted  Lungs: Clear to auscultation bilaterally.  No wheezes, rales or rhonchi.  Good inspiratory and expiratory breath sounds  Abdomen: Soft, non-tender, no masses.  No hepatosplenomegaly.  Negative Ludwig's  Psych: Alert and oriented x3, normal affect and mood.  Neuro: Cranial nerves II through XII were assessed and intact.  Normal gait, normal " cerebellar function noted  Musculoskeletal: Full range of motion, good strength against resistance    Clinical Course/Lab Analysis:        Assessment and Plan:   The following treatment plan was discussed.  Signs and symptoms for which to return were discussed with patient at length.  Patient verbalized understanding.    1. Current moderate episode of major depressive disorder without prior episode (HCC)  New and unstable  PHQ-9 was 20  Please exercise daily  - sertraline (ZOLOFT) 25 MG tablet; Take 1 Tablet by mouth every day.  Dispense: 30 Tablet; Refill: 11    2. Abnormal CBC  New and unstable  Noted in ER at last week's visit.  Will recheck  - CBC WITH DIFFERENTIAL; Future    3. Screening for thyroid disorder  Will check thyroid knowing that this could affect her mood as well  - TSH WITH REFLEX TO FT4; Future         Followup: 1 month to see how medication is.  Likely will need to increase dose    Please note that this dictation was created using voice recognition software. I have made every reasonable attempt to correct obvious errors, but I expect that there are errors of grammar and possibly content that I did not discover before finalizing the note.

## 2022-06-28 NOTE — ASSESSMENT & PLAN NOTE
Patient comes in very tearful and states the last month has been very hard for her.  She was in a long-term relationship with a woman who had a baby.  She states she raised the baby like her own and they broke up after 4 years.  This woman is no longer allowing her to see the child.  She now is in a new relationship but states she is not able to see the child and has been very anxious and tearful.  She does not drink alcohol and has not been smoking any pot.  She cries frequently..  Denies HI/SI

## 2022-07-01 DIAGNOSIS — F32.1 CURRENT MODERATE EPISODE OF MAJOR DEPRESSIVE DISORDER WITHOUT PRIOR EPISODE (HCC): ICD-10-CM

## 2022-07-01 RX ORDER — CITALOPRAM HYDROBROMIDE 10 MG/1
10 TABLET ORAL DAILY
Qty: 30 TABLET | Refills: 2 | Status: SHIPPED | OUTPATIENT
Start: 2022-07-01 | End: 2022-07-26 | Stop reason: SDUPTHER

## 2022-07-08 ENCOUNTER — HOSPITAL ENCOUNTER (OUTPATIENT)
Dept: LAB | Facility: MEDICAL CENTER | Age: 37
End: 2022-07-08
Attending: PHYSICIAN ASSISTANT
Payer: COMMERCIAL

## 2022-07-08 DIAGNOSIS — Z13.29 SCREENING FOR THYROID DISORDER: ICD-10-CM

## 2022-07-08 DIAGNOSIS — H53.9 VISUAL DISTURBANCE: ICD-10-CM

## 2022-07-08 LAB
ALBUMIN SERPL BCP-MCNC: 4.5 G/DL (ref 3.2–4.9)
ALBUMIN/GLOB SERPL: 1.9 G/DL
ALP SERPL-CCNC: 55 U/L (ref 30–99)
ALT SERPL-CCNC: 22 U/L (ref 2–50)
ANION GAP SERPL CALC-SCNC: 11 MMOL/L (ref 7–16)
AST SERPL-CCNC: 24 U/L (ref 12–45)
BASOPHILS # BLD AUTO: 0.8 % (ref 0–1.8)
BASOPHILS # BLD: 0.06 K/UL (ref 0–0.12)
BILIRUB SERPL-MCNC: 0.3 MG/DL (ref 0.1–1.5)
BUN SERPL-MCNC: 14 MG/DL (ref 8–22)
CALCIUM SERPL-MCNC: 9.2 MG/DL (ref 8.4–10.2)
CHLORIDE SERPL-SCNC: 102 MMOL/L (ref 96–112)
CO2 SERPL-SCNC: 24 MMOL/L (ref 20–33)
CREAT SERPL-MCNC: 0.73 MG/DL (ref 0.5–1.4)
EOSINOPHIL # BLD AUTO: 0.58 K/UL (ref 0–0.51)
EOSINOPHIL NFR BLD: 7.9 % (ref 0–6.9)
ERYTHROCYTE [DISTWIDTH] IN BLOOD BY AUTOMATED COUNT: 43.6 FL (ref 35.9–50)
GFR SERPLBLD CREATININE-BSD FMLA CKD-EPI: 110 ML/MIN/1.73 M 2
GLOBULIN SER CALC-MCNC: 2.4 G/DL (ref 1.9–3.5)
GLUCOSE SERPL-MCNC: 100 MG/DL (ref 65–99)
HCT VFR BLD AUTO: 41.1 % (ref 37–47)
HGB BLD-MCNC: 14.1 G/DL (ref 12–16)
IMM GRANULOCYTES # BLD AUTO: 0.01 K/UL (ref 0–0.11)
IMM GRANULOCYTES NFR BLD AUTO: 0.1 % (ref 0–0.9)
LYMPHOCYTES # BLD AUTO: 1.48 K/UL (ref 1–4.8)
LYMPHOCYTES NFR BLD: 20.1 % (ref 22–41)
MCH RBC QN AUTO: 33.5 PG (ref 27–33)
MCHC RBC AUTO-ENTMCNC: 34.3 G/DL (ref 33.6–35)
MCV RBC AUTO: 97.6 FL (ref 81.4–97.8)
MONOCYTES # BLD AUTO: 0.38 K/UL (ref 0–0.85)
MONOCYTES NFR BLD AUTO: 5.2 % (ref 0–13.4)
NEUTROPHILS # BLD AUTO: 4.84 K/UL (ref 2–7.15)
NEUTROPHILS NFR BLD: 65.9 % (ref 44–72)
NRBC # BLD AUTO: 0 K/UL
NRBC BLD-RTO: 0 /100 WBC
PLATELET # BLD AUTO: 261 K/UL (ref 164–446)
PMV BLD AUTO: 9.8 FL (ref 9–12.9)
POTASSIUM SERPL-SCNC: 3.8 MMOL/L (ref 3.6–5.5)
PROT SERPL-MCNC: 6.9 G/DL (ref 6–8.2)
RBC # BLD AUTO: 4.21 M/UL (ref 4.2–5.4)
SODIUM SERPL-SCNC: 137 MMOL/L (ref 135–145)
TSH SERPL DL<=0.005 MIU/L-ACNC: 1.57 UIU/ML (ref 0.38–5.33)
WBC # BLD AUTO: 7.4 K/UL (ref 4.8–10.8)

## 2022-07-08 PROCEDURE — 80053 COMPREHEN METABOLIC PANEL: CPT

## 2022-07-08 PROCEDURE — 84443 ASSAY THYROID STIM HORMONE: CPT

## 2022-07-08 PROCEDURE — 36415 COLL VENOUS BLD VENIPUNCTURE: CPT

## 2022-07-08 PROCEDURE — 85025 COMPLETE CBC W/AUTO DIFF WBC: CPT

## 2022-07-09 ENCOUNTER — OFFICE VISIT (OUTPATIENT)
Dept: URGENT CARE | Facility: CLINIC | Age: 37
End: 2022-07-09
Payer: COMMERCIAL

## 2022-07-09 VITALS
SYSTOLIC BLOOD PRESSURE: 124 MMHG | WEIGHT: 115.6 LBS | BODY MASS INDEX: 19.74 KG/M2 | RESPIRATION RATE: 16 BRPM | DIASTOLIC BLOOD PRESSURE: 88 MMHG | HEART RATE: 76 BPM | TEMPERATURE: 98.3 F | HEIGHT: 64 IN | OXYGEN SATURATION: 97 %

## 2022-07-09 DIAGNOSIS — J98.8 RTI (RESPIRATORY TRACT INFECTION): ICD-10-CM

## 2022-07-09 DIAGNOSIS — J45.21 MILD INTERMITTENT REACTIVE AIRWAY DISEASE WITH ACUTE EXACERBATION: ICD-10-CM

## 2022-07-09 PROCEDURE — 99214 OFFICE O/P EST MOD 30 MIN: CPT | Mod: 25 | Performed by: PHYSICIAN ASSISTANT

## 2022-07-09 PROCEDURE — 94640 AIRWAY INHALATION TREATMENT: CPT | Performed by: PHYSICIAN ASSISTANT

## 2022-07-09 RX ORDER — PREDNISONE 20 MG/1
TABLET ORAL
Qty: 10 TABLET | Refills: 0 | Status: SHIPPED | OUTPATIENT
Start: 2022-07-09 | End: 2023-09-18

## 2022-07-09 RX ORDER — DOXYCYCLINE HYCLATE 100 MG
100 TABLET ORAL 2 TIMES DAILY
Qty: 14 TABLET | Refills: 0 | Status: SHIPPED | OUTPATIENT
Start: 2022-07-09 | End: 2022-07-16

## 2022-07-09 RX ORDER — IPRATROPIUM BROMIDE AND ALBUTEROL SULFATE 2.5; .5 MG/3ML; MG/3ML
3 SOLUTION RESPIRATORY (INHALATION) ONCE
Status: COMPLETED | OUTPATIENT
Start: 2022-07-09 | End: 2022-07-09

## 2022-07-09 RX ADMIN — IPRATROPIUM BROMIDE AND ALBUTEROL SULFATE 3 ML: 2.5; .5 SOLUTION RESPIRATORY (INHALATION) at 10:24

## 2022-07-09 ASSESSMENT — ENCOUNTER SYMPTOMS
CONSTIPATION: 0
CHILLS: 0
RHINORRHEA: 0
ABDOMINAL PAIN: 0
DIARRHEA: 0
SORE THROAT: 0
NAUSEA: 0
COUGH: 1
SHORTNESS OF BREATH: 1
HEMOPTYSIS: 0
HEADACHES: 0
MYALGIAS: 0
VOMITING: 0
WHEEZING: 1
SPUTUM PRODUCTION: 1
FEVER: 0

## 2022-07-09 ASSESSMENT — FIBROSIS 4 INDEX: FIB4 SCORE: 0.67

## 2022-07-09 NOTE — PROGRESS NOTES
"Subjective     Jazmine Reinoso is a 36 y.o. female who presents with Cough (2x months, \" S.O.B. last 2 weeks have been worse, recently exposed to Covid in the last 7 days, 2x negative Covid tests. Pulmonologist apt 7/20\" )            Cough  This is a new problem. Episode onset: 2 months  The problem has been gradually worsening (worsening over the past 2 weeks ). The problem occurs constantly. The cough is productive of sputum. Associated symptoms include shortness of breath and wheezing. Pertinent negatives include no chest pain, chills, ear pain, fever, headaches, hemoptysis, myalgias, nasal congestion, rash, rhinorrhea or sore throat. Associated symptoms comments: Chest tightness . Nothing aggravates the symptoms. She has tried a beta-agonist inhaler for the symptoms. Improvement on treatment: minimal relief      The patient has had a chest X-ray recently and treated with Azithromycin. She states her symptoms have not improved. She reports 2 COVID tests in the last week- last test was yesterday- COVID tests have come back negative.  She reports long history of smoking tobacco, vaping and smoking marijuana. She has quit all of this. She has an appointment with a Pulmonologist on 7/20/2022.    Past Medical History:   Diagnosis Date   • Herpes 2019       History reviewed. No pertinent surgical history.    Family History   Problem Relation Age of Onset   • Cancer Father    • Cancer Maternal Grandmother 70       Allergies   Allergen Reactions   • Amoxicillin    • Pcn [Penicillins]        Medications, Allergies, and current problem list reviewed today in Epic      Review of Systems   Constitutional: Positive for malaise/fatigue. Negative for chills and fever.   HENT: Negative for congestion, ear discharge, ear pain, rhinorrhea and sore throat.    Respiratory: Positive for cough, sputum production (patient reports she tastes blood ), shortness of breath and wheezing. Negative for hemoptysis.    Cardiovascular: " "Negative for chest pain and leg swelling.   Gastrointestinal: Negative for abdominal pain, constipation, diarrhea, nausea and vomiting.   Musculoskeletal: Negative for myalgias.   Skin: Negative for rash.   Neurological: Negative for headaches.         All other systems reviewed and are negative.         Objective     /88 (BP Location: Left arm, Patient Position: Sitting, BP Cuff Size: Adult)   Pulse 76   Temp 36.8 °C (98.3 °F) (Temporal)   Resp 16   Ht 1.626 m (5' 4\")   Wt 52.4 kg (115 lb 9.6 oz)   LMP 06/14/2022 (Approximate)   SpO2 97%   BMI 19.84 kg/m²      Physical Exam  Constitutional:       General: She is not in acute distress.     Appearance: Normal appearance. She is not ill-appearing.   HENT:      Head: Normocephalic and atraumatic.      Nose: Nose normal.   Eyes:      Conjunctiva/sclera: Conjunctivae normal.   Cardiovascular:      Rate and Rhythm: Normal rate and regular rhythm.      Heart sounds: Normal heart sounds. No murmur heard.  Pulmonary:      Effort: Pulmonary effort is normal. No respiratory distress.      Breath sounds: No stridor. Wheezing and rhonchi present. No rales.      Comments: Diffuse wheezes and rhonchi   Skin:     General: Skin is warm and dry.   Neurological:      General: No focal deficit present.      Mental Status: She is alert and oriented to person, place, and time.   Psychiatric:         Mood and Affect: Mood normal.         Behavior: Behavior normal.         Thought Content: Thought content normal.         Judgment: Judgment normal.                             Assessment & Plan        1. Mild intermittent reactive airway disease with acute exacerbation  doxycycline (VIBRAMYCIN) 100 MG Tab    predniSONE (DELTASONE) 20 MG Tab    ipratropium-albuterol (DUONEB) nebulizer solution   2. RTI (respiratory tract infection)         No X-ray in office. Patient Declined X-ray off site. Last X-ray 6/21/2022 which came back normal.     - doxycycline (VIBRAMYCIN) 100 MG Tab; " Take 1 Tablet by mouth 2 times a day for 7 days.  Dispense: 14 Tablet; Refill: 0  - predniSONE (DELTASONE) 20 MG Tab; 2 tabs po daily x 5 days.  Dispense: 10 Tablet; Refill: 0  - ipratropium-albuterol (DUONEB) nebulizer solution---- Patient reports drastic improvement after nebulizer     Continue Rescue inhaler.  Follow-up with Pulmonology.     Differential diagnoses, Supportive care, and indications for immediate follow-up discussed with patient.   Pathogenesis of diagnosis discussed including typical length and natural progression.   Instructed to return to clinic or nearest emergency department for any change in condition, further concerns, or worsening of symptoms.    The patient demonstrated a good understanding and agreed with the treatment plan.    Karly Donovan P.A.-C.

## 2022-07-20 ENCOUNTER — HOSPITAL ENCOUNTER (OUTPATIENT)
Dept: PULMONOLOGY | Facility: MEDICAL CENTER | Age: 37
End: 2022-07-20
Attending: PHYSICIAN ASSISTANT
Payer: COMMERCIAL

## 2022-07-20 DIAGNOSIS — J45.20 MILD INTERMITTENT REACTIVE AIRWAY DISEASE WITHOUT COMPLICATION: ICD-10-CM

## 2022-07-20 DIAGNOSIS — R07.89 CHEST TIGHTNESS: ICD-10-CM

## 2022-07-20 DIAGNOSIS — R05.9 COUGH: ICD-10-CM

## 2022-07-20 PROCEDURE — 94060 EVALUATION OF WHEEZING: CPT

## 2022-07-20 PROCEDURE — 94060 EVALUATION OF WHEEZING: CPT | Mod: 26 | Performed by: INTERNAL MEDICINE

## 2022-07-20 RX ORDER — ALBUTEROL SULFATE 2.5 MG/3ML
2.5 SOLUTION RESPIRATORY (INHALATION)
Status: DISCONTINUED | OUTPATIENT
Start: 2022-07-20 | End: 2022-07-21 | Stop reason: HOSPADM

## 2022-07-20 RX ADMIN — ALBUTEROL SULFATE 2.5 MG: 2.5 SOLUTION RESPIRATORY (INHALATION) at 07:11

## 2022-07-20 ASSESSMENT — PULMONARY FUNCTION TESTS
FEV1_PERCENT_CHANGE: 38
FEV1/FVC: 59
FEV1/FVC_PERCENT_CHANGE: 3800
FEV1/FVC_PERCENT_PREDICTED: 51
FVC_LLN: 3.15
FEV1/FVC_PERCENT_LLN: 70
FVC_PREDICTED: 3.77
FEV1/FVC: 43
FEV1: 2.43
FEV1_PERCENT_CHANGE: 1
FEV1/FVC_PERCENT_PREDICTED: 84
FEV1/FVC_PERCENT_PREDICTED: 51
FEV1/FVC_PERCENT_PREDICTED: 71
FVC: 4.12
FEV1_LLN: 2.63
FEV1_PERCENT_PREDICTED: 55
FEV1: 1.75
FEV1_PERCENT_PREDICTED: 77
FVC_LLN: 3.15
FEV1_LLN: 2.63
FVC_PERCENT_PREDICTED: 107
FEV1/FVC_PERCENT_PREDICTED: 70
FEV1_PREDICTED: 3.15
FVC: 4.05
FVC_PERCENT_PREDICTED: 109
FEV1/FVC_PERCENT_CHANGE: 36
FEV1/FVC: 58.98
FEV1/FVC_PERCENT_LLN: 70
FEV1/FVC_PREDICTED: 84
FEV1/FVC: 43

## 2022-07-25 NOTE — PROCEDURES
DATE OF SERVICE:  07/20/2022     PULMONARY FUNCTION TEST INTERPRETATION     The results of this test meet the ATS standards for acceptability and   repeatability.     SPIROMETRY:  There is evidence of obstruction, manifested by an FEV1/FVC of 43%.  Noted   that the FEV1 was 77% in the post-bronchodilator arm, which correlates to 2.43   liters.  There is marked response to bronchodilators in this test manifested by an   improvement in FEV1 from the pre to post-bronchodilator meeting all the   criteria from ATS.     FLOW VOLUME CURVES:  The flow volume curves correlates with information above.     CONCLUSION:  There is evidence of a mild obstructive pattern with a marked   response to bronchodilators, patient should be encouraged to use   bronchodilators.  Clinical and radiographic correlation is recommended.        ______________________________  MD HERMANN Mccullough/AMI    DD:  07/24/2022 14:05  DT:  07/24/2022 18:15    Job#:  [id number]

## 2022-07-26 ENCOUNTER — TELEMEDICINE (OUTPATIENT)
Dept: MEDICAL GROUP | Facility: MEDICAL CENTER | Age: 37
End: 2022-07-26

## 2022-07-26 ENCOUNTER — APPOINTMENT (OUTPATIENT)
Dept: MEDICAL GROUP | Facility: MEDICAL CENTER | Age: 37
End: 2022-07-26
Payer: COMMERCIAL

## 2022-07-26 DIAGNOSIS — F32.1 CURRENT MODERATE EPISODE OF MAJOR DEPRESSIVE DISORDER WITHOUT PRIOR EPISODE (HCC): ICD-10-CM

## 2022-07-26 DIAGNOSIS — J45.41 MODERATE PERSISTENT ASTHMA WITH ACUTE EXACERBATION: ICD-10-CM

## 2022-07-26 DIAGNOSIS — R05.9 COUGH: ICD-10-CM

## 2022-07-26 PROBLEM — J45.20 MILD INTERMITTENT ASTHMA: Status: ACTIVE | Noted: 2022-07-26

## 2022-07-26 PROCEDURE — 99213 OFFICE O/P EST LOW 20 MIN: CPT | Mod: 95 | Performed by: PHYSICIAN ASSISTANT

## 2022-07-26 RX ORDER — BENZONATATE 100 MG/1
100 CAPSULE ORAL 3 TIMES DAILY PRN
Qty: 30 CAPSULE | Refills: 0 | Status: SHIPPED | OUTPATIENT
Start: 2022-07-26 | End: 2023-09-18

## 2022-07-26 NOTE — PROGRESS NOTES
Virtual Visit: Established Patient   This visit was conducted via Zoom using secure and encrypted videoconferencing technology. The patient was in a private location in the state of [place].    The patient's identity was confirmed and verbal consent was obtained for this virtual visit.    Subjective:   CC:   Chief Complaint   Patient presents with   • COPD       Jazmine Reinoso is a 36 y.o. female presenting for evaluation and management of:    Mild intermittent asthma  Patient is here for virtual visit for suspected asthma.  Has had shortness of breath, wheezing and cough intermittently for the last few months.  Thought it was associated with vaping but has not vape now for 3 months.  Has had urgent care visit and primary care visits where she has needed inhalers and steroids.  Inhalers do help.  She had PFTs done and was concerned that she has COPD       ROS   Denies any recent fevers or chills. No nausea or vomiting. No chest pains or shortness of breath.     Allergies   Allergen Reactions   • Amoxicillin    • Pcn [Penicillins]        Current medicines (including changes today)  Current Outpatient Medications   Medication Sig Dispense Refill   • Fluticasone Furoate-Vilanterol (BREO) 100-25 MCG/INH AEROSOL POWDER, BREATH ACTIVATED Inhale one puff daily. Rinse mouth after 28 Each 5   • benzonatate (TESSALON) 100 MG Cap Take 1 Capsule by mouth 3 times a day as needed for Cough. 30 Capsule 0   • predniSONE (DELTASONE) 20 MG Tab 2 tabs po daily x 5 days. 10 Tablet 0   • citalopram (CELEXA) 10 MG tablet Take 1 Tablet by mouth every day. Take half a tablet for the first 5 days. 30 Tablet 2   • azithromycin (ZITHROMAX) 250 MG Tab Take two tablets on day one, then on tablet the following four days 6 Tablet 0   • albuterol 108 (90 Base) MCG/ACT Aero Soln inhalation aerosol Inhale 2 Puffs every 6 hours as needed for Shortness of Breath. 8.5 g 0     No current facility-administered medications for this visit.        Patient Active Problem List    Diagnosis Date Noted   • Mild intermittent asthma 07/26/2022   • Current moderate episode of major depressive disorder without prior episode (HCC) 06/28/2022   • Cough 06/07/2022   • Head injury 03/03/2022   • Acute bilateral low back pain without sciatica 05/13/2021   • Genital herpes 01/09/2020   • Mood disorder (HCC) 01/09/2020   • Family history of heart attack 01/09/2020       Family History   Problem Relation Age of Onset   • Cancer Father    • Cancer Maternal Grandmother 70       She  has a past medical history of Herpes (2019).  She  has no past surgical history on file.       Objective:   There were no vitals taken for this visit.    Physical Exam:  Constitutional: Alert, no distress, well-groomed.  Skin: No rashes in visible areas.  Eye: Round. Conjunctiva clear, lids normal. No icterus.   ENMT: Lips pink without lesions, good dentition, moist mucous membranes. Phonation normal.  Neck: No masses, no thyromegaly. Moves freely without pain.  Respiratory: Unlabored respiratory effort, no cough or audible wheeze  Psych: Alert and oriented x3, normal affect and mood.       Assessment and Plan:   The following treatment plan was discussed:     1. Moderate persistent asthma with acute exacerbation  Chronic and unstable   continue Ventolin.  Continue to avoid vaping  .  Patient's PFTs did show reversible airflow obstruction.  I will write for Breo and have her follow-up  - Fluticasone Furoate-Vilanterol (BREO) 100-25 MCG/INH AEROSOL POWDER, BREATH ACTIVATED; Inhale one puff daily. Rinse mouth after  Dispense: 28 Each; Refill: 5  - benzonatate (TESSALON) 100 MG Cap; Take 1 Capsule by mouth 3 times a day as needed for Cough.  Dispense: 30 Capsule; Refill: 0  Virtual Visit: Established Patient   This visit was conducted via Zoom using secure and encrypted videoconferencing technology. The patient was in a private location in the state [facility].    The patient's identity was  confirmed and verbal consent was obtained for this virtual visit.    Subjective:   CC:   Chief Complaint   Patient presents with   • COPD       Jazmine Reinoso is a 36 y.o. female presenting for evaluation and management of:    Mild intermittent asthma  Patient is here for virtual visit for suspected asthma.  Has had shortness of breath, wheezing and cough intermittently for the last few months.  Thought it was associated with vaping but has not vape now for 3 months.  Has had urgent care visit and primary care visits where she has needed inhalers and steroids.  Inhalers do help.  She had PFTs done and was concerned that she has COPD       ROS   Denies any recent fevers or chills. No nausea or vomiting. No chest pains or shortness of breath.     Allergies   Allergen Reactions   • Amoxicillin    • Pcn [Penicillins]        Current medicines (including changes today)  Current Outpatient Medications   Medication Sig Dispense Refill   • Fluticasone Furoate-Vilanterol (BREO) 100-25 MCG/INH AEROSOL POWDER, BREATH ACTIVATED Inhale one puff daily. Rinse mouth after 28 Each 5   • benzonatate (TESSALON) 100 MG Cap Take 1 Capsule by mouth 3 times a day as needed for Cough. 30 Capsule 0   • predniSONE (DELTASONE) 20 MG Tab 2 tabs po daily x 5 days. 10 Tablet 0   • citalopram (CELEXA) 10 MG tablet Take 1 Tablet by mouth every day. Take half a tablet for the first 5 days. 30 Tablet 2   • azithromycin (ZITHROMAX) 250 MG Tab Take two tablets on day one, then on tablet the following four days 6 Tablet 0   • albuterol 108 (90 Base) MCG/ACT Aero Soln inhalation aerosol Inhale 2 Puffs every 6 hours as needed for Shortness of Breath. 8.5 g 0     No current facility-administered medications for this visit.       Patient Active Problem List    Diagnosis Date Noted   • Mild intermittent asthma 07/26/2022   • Current moderate episode of major depressive disorder without prior episode ([facility]) 06/28/2022   • Cough 06/07/2022   • Head injury  03/03/2022   • Acute bilateral low back pain without sciatica 05/13/2021   • Genital herpes 01/09/2020   • Mood disorder (HCC) 01/09/2020   • Family history of heart attack 01/09/2020       Family History   Problem Relation Age of Onset   • Cancer Father    • Cancer Maternal Grandmother 70       She  has a past medical history of Herpes (2019).  She  has no past surgical history on file.       Objective:   There were no vitals taken for this visit.    Physical Exam:  Constitutional: Alert, no distress, well-groomed.  Skin: No rashes in visible areas.  Eye: Round. Conjunctiva clear, lids normal. No icterus.   ENMT: Lips pink without lesions, good dentition, moist mucous membranes. Phonation normal.  Neck: No masses, no thyromegaly. Moves freely without pain.  Respiratory: Unlabored respiratory effort, no cough or audible wheeze  Psych: Alert and oriented x3, normal affect and mood.       Assessment and Plan:   The following treatment plan was discussed:     1. Moderate persistent asthma with acute exacerbation  - Fluticasone Furoate-Vilanterol (BREO) 100-25 MCG/INH AEROSOL POWDER, BREATH ACTIVATED; Inhale one puff daily. Rinse mouth after  Dispense: 28 Each; Refill: 5  - benzonatate (TESSALON) 100 MG Cap; Take 1 Capsule by mouth 3 times a day as needed for Cough.  Dispense: 30 Capsule; Refill: 0        Follow-up: Return in about 3 months (around 10/26/2022).             Follow-up: No follow-ups on file.

## 2022-07-26 NOTE — ASSESSMENT & PLAN NOTE
Patient is here for virtual visit for suspected asthma.  Has had shortness of breath, wheezing and cough intermittently for the last few months.  Thought it was associated with vaping but has not vape now for 3 months.  Has had urgent care visit and primary care visits where she has needed inhalers and steroids.  Inhalers do help.  She had PFTs done and was concerned that she has COPD

## 2022-07-28 RX ORDER — CITALOPRAM HYDROBROMIDE 10 MG/1
10 TABLET ORAL DAILY
Qty: 30 TABLET | Refills: 2 | Status: SHIPPED | OUTPATIENT
Start: 2022-07-28 | End: 2023-09-18

## 2022-07-28 RX ORDER — ALBUTEROL SULFATE 90 UG/1
2 AEROSOL, METERED RESPIRATORY (INHALATION) EVERY 6 HOURS PRN
Qty: 8.5 G | Refills: 0 | Status: SHIPPED | OUTPATIENT
Start: 2022-07-28 | End: 2023-10-19

## 2022-11-07 DIAGNOSIS — J45.41 MODERATE PERSISTENT ASTHMA WITH ACUTE EXACERBATION: ICD-10-CM

## 2022-11-07 RX ORDER — ALBUTEROL SULFATE 2.5 MG/3ML
2.5 SOLUTION RESPIRATORY (INHALATION) EVERY 4 HOURS PRN
Qty: 25 EACH | Refills: 2 | Status: SHIPPED | OUTPATIENT
Start: 2022-11-07

## 2022-11-14 ENCOUNTER — APPOINTMENT (OUTPATIENT)
Dept: MEDICAL GROUP | Facility: MEDICAL CENTER | Age: 37
End: 2022-11-14
Payer: COMMERCIAL

## 2023-03-28 DIAGNOSIS — J45.41 MODERATE PERSISTENT ASTHMA WITH ACUTE EXACERBATION: ICD-10-CM

## 2023-03-28 RX ORDER — FLUTICASONE FUROATE AND VILANTEROL 100; 25 UG/1; UG/1
POWDER RESPIRATORY (INHALATION)
Qty: 28 EACH | Refills: 5 | Status: SHIPPED | OUTPATIENT
Start: 2023-03-28 | End: 2023-09-18 | Stop reason: SDUPTHER

## 2023-09-18 ENCOUNTER — OFFICE VISIT (OUTPATIENT)
Dept: MEDICAL GROUP | Facility: MEDICAL CENTER | Age: 38
End: 2023-09-18
Payer: COMMERCIAL

## 2023-09-18 VITALS
HEART RATE: 95 BPM | WEIGHT: 138 LBS | HEIGHT: 64 IN | SYSTOLIC BLOOD PRESSURE: 114 MMHG | RESPIRATION RATE: 20 BRPM | TEMPERATURE: 98 F | BODY MASS INDEX: 23.56 KG/M2 | DIASTOLIC BLOOD PRESSURE: 76 MMHG | OXYGEN SATURATION: 88 %

## 2023-09-18 DIAGNOSIS — A60.00 GENITAL HERPES SIMPLEX, UNSPECIFIED SITE: ICD-10-CM

## 2023-09-18 DIAGNOSIS — M77.8 WRIST TENDONITIS: ICD-10-CM

## 2023-09-18 DIAGNOSIS — J45.41 MODERATE PERSISTENT ASTHMA WITH ACUTE EXACERBATION: ICD-10-CM

## 2023-09-18 PROCEDURE — 3078F DIAST BP <80 MM HG: CPT | Performed by: PHYSICIAN ASSISTANT

## 2023-09-18 PROCEDURE — 3074F SYST BP LT 130 MM HG: CPT | Performed by: PHYSICIAN ASSISTANT

## 2023-09-18 PROCEDURE — 99214 OFFICE O/P EST MOD 30 MIN: CPT | Performed by: PHYSICIAN ASSISTANT

## 2023-09-18 RX ORDER — METHYLPREDNISOLONE 4 MG/1
TABLET ORAL
Qty: 21 TABLET | Refills: 0 | Status: SHIPPED | OUTPATIENT
Start: 2023-09-18 | End: 2023-10-19

## 2023-09-18 RX ORDER — FLUTICASONE FUROATE AND VILANTEROL 100; 25 UG/1; UG/1
POWDER RESPIRATORY (INHALATION)
Qty: 28 EACH | Refills: 5 | Status: SHIPPED | OUTPATIENT
Start: 2023-09-18

## 2023-09-18 RX ORDER — VALACYCLOVIR HYDROCHLORIDE 500 MG/1
500 TABLET, FILM COATED ORAL 2 TIMES DAILY
Qty: 30 TABLET | Refills: 2 | Status: SHIPPED | OUTPATIENT
Start: 2023-09-18 | End: 2023-09-23

## 2023-09-18 RX ORDER — ALBUTEROL SULFATE 90 UG/1
1-2 AEROSOL, METERED RESPIRATORY (INHALATION) EVERY 4 HOURS PRN
Qty: 1 EACH | Refills: 1 | Status: SHIPPED | OUTPATIENT
Start: 2023-09-18 | End: 2024-03-20 | Stop reason: SDUPTHER

## 2023-09-18 ASSESSMENT — PATIENT HEALTH QUESTIONNAIRE - PHQ9
8. MOVING OR SPEAKING SO SLOWLY THAT OTHER PEOPLE COULD HAVE NOTICED. OR THE OPPOSITE, BEING SO FIGETY OR RESTLESS THAT YOU HAVE BEEN MOVING AROUND A LOT MORE THAN USUAL: NOT AT ALL
5. POOR APPETITE OR OVEREATING: NOT AT ALL
SUM OF ALL RESPONSES TO PHQ QUESTIONS 1-9: 0
4. FEELING TIRED OR HAVING LITTLE ENERGY: NOT AT ALL
1. LITTLE INTEREST OR PLEASURE IN DOING THINGS: NOT AT ALL
2. FEELING DOWN, DEPRESSED, IRRITABLE, OR HOPELESS: NOT AT ALL
SUM OF ALL RESPONSES TO PHQ9 QUESTIONS 1 AND 2: 0
7. TROUBLE CONCENTRATING ON THINGS, SUCH AS READING THE NEWSPAPER OR WATCHING TELEVISION: NOT AT ALL
3. TROUBLE FALLING OR STAYING ASLEEP OR SLEEPING TOO MUCH: NOT AT ALL
6. FEELING BAD ABOUT YOURSELF - OR THAT YOU ARE A FAILURE OR HAVE LET YOURSELF OR YOUR FAMILY DOWN: NOT AL ALL
9. THOUGHTS THAT YOU WOULD BE BETTER OFF DEAD, OR OF HURTING YOURSELF: NOT AT ALL

## 2023-09-18 ASSESSMENT — FIBROSIS 4 INDEX: FIB4 SCORE: 0.69

## 2023-09-18 NOTE — PROGRESS NOTES
"Subjective:   Jazmine Reinoso is a 37 y.o. female here today for     HPI:Patient is here to discuss: Bilateral wrist pain    Patient reports bilateral wrist pain.  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  She is noticed now pain with twisting motions such as twisting jars denies numbness or tingling in her fingers    ROS   No headaches, chest pain, no shortness of breath, abdominal pain, nausea, or vomiting.  All other systems were reviewed and are negative or noted as positive in the HPI.     Objective:     /76   Pulse 95   Temp 36.7 °C (98 °F) (Temporal)   Resp 20   Ht 1.63 m (5' 4.17\")   Wt 62.6 kg (138 lb)   SpO2 88%  Body mass index is 23.56 kg/m².     Physical Exam:  General: Patient appears well-nourished, well-hydrated, nontoxic  HEENT, normocephalic atraumatic, PERRLA, extraocular movements intact, nares are patent and clear  Neck: No visible masses, thyromegaly or abnormalities noted  Cardiovascular.  Sitting comfortably without visible signs of edema  Lungs: No cyanosis noted, nondyspneic  Skin: Well perfused without evidence of rash or lesions  Neurological: Cranial nerves II through XII intact, normal gait  Musculoskeletal: Normal range of motion, normal strength and no deficit noted   Positive Tinel's negative Phalen  Clinical Course/Lab Analysis:        Assessment and Plan:   The following treatment plan was discussed.  Signs and symptoms for which to return were discussed with patient at length.  Patient verbalized understanding.    1. Wrist tendonitis  Sed Rate    RHEUMATOID ARTHRITIS FACTOR    CBC WITH DIFFERENTIAL    methylPREDNISolone (MEDROL DOSEPAK) 4 MG Tablet Therapy Pack      2. Moderate persistent asthma with acute exacerbation  fluticasone furoate-vilanterol (BREO ELLIPTA) 100-25 MCG/ACT AEROSOL POWDER, BREATH ACTIVATED    albuterol 108 (90 Base) MCG/ACT " Aero Soln inhalation aerosol      3. Genital herpes simplex, unspecified site  valACYclovir (VALTREX) 500 MG Tab          1.  Wrist tendinitis is likely overuse syndrome but this is new and unstable.  Discussed getting a carpal tunnel wrist brace and using I will order labs to ensure no autoimmune process going on and send a steroid pack to the pharmacy    2.  Asthma is chronic and stable.  I refilled medications    3.  Genital herpes is chronic and stable I refilled medication      Followup: Follow-up in 2 weeks to see if steroid pack, wrist brace and not overuse is helping risks     please note that this dictation was created using voice recognition software. I have made every reasonable attempt to correct obvious errors, but I expect that there are errors of grammar and possibly content that I did not discover before finalizing the note.

## 2023-10-05 ENCOUNTER — TELEPHONE (OUTPATIENT)
Dept: MEDICAL GROUP | Facility: MEDICAL CENTER | Age: 38
End: 2023-10-05
Payer: COMMERCIAL

## 2023-10-05 NOTE — TELEPHONE ENCOUNTER
FINAL PRIOR AUTHORIZATION STATUS:Jazmine Reinoso Key: [id number] - [place] Case ID: [id number] - Rx #: [id number]    1.  Name of Medication & Dose: Drug  Fluticasone Furoate-Vilanterol 100-25MCG/ACT aerosol powder    2. Prior Auth Status: Outcome  N/Atoday  No Prior Authorization is required at this time based on the alternative drug you chose to prescribe.;CaseId:[id number];Status:Cancelled;

## 2023-10-05 NOTE — TELEPHONE ENCOUNTER
MEDICATION PRIOR AUTHORIZATION NEEDED:  Jazmine Reinoso Bucio: [id number] - Rx #: [id number]    1. Name of Medication: Drug  Fluticasone Furoate-Vilanterol 100-25MCG/ACT aerosol powder    2. Requested By (Name of Pharmacy): Pharmacy:   [id number] - [place], [place] - [address] [address] [facility]       3. Is insurance on file current? YES       tatus  Sent to [facility]  No Prior Authorization is required at this time based on the alternative drug you chose to prescribe.;CaseId:[id number];Status:Cancelled;

## 2023-10-19 ENCOUNTER — OFFICE VISIT (OUTPATIENT)
Dept: MEDICAL GROUP | Facility: MEDICAL CENTER | Age: 38
End: 2023-10-19
Payer: COMMERCIAL

## 2023-10-19 VITALS
WEIGHT: 134.5 LBS | BODY MASS INDEX: 22.96 KG/M2 | TEMPERATURE: 98 F | HEIGHT: 64 IN | RESPIRATION RATE: 20 BRPM | SYSTOLIC BLOOD PRESSURE: 118 MMHG | HEART RATE: 94 BPM | OXYGEN SATURATION: 98 % | DIASTOLIC BLOOD PRESSURE: 78 MMHG

## 2023-10-19 DIAGNOSIS — M77.8 WRIST TENDONITIS: ICD-10-CM

## 2023-10-19 PROCEDURE — 3078F DIAST BP <80 MM HG: CPT | Performed by: PHYSICIAN ASSISTANT

## 2023-10-19 PROCEDURE — 3074F SYST BP LT 130 MM HG: CPT | Performed by: PHYSICIAN ASSISTANT

## 2023-10-19 PROCEDURE — 99214 OFFICE O/P EST MOD 30 MIN: CPT | Performed by: PHYSICIAN ASSISTANT

## 2023-10-19 RX ORDER — NAPROXEN 500 MG/1
500 TABLET ORAL 2 TIMES DAILY WITH MEALS
Qty: 30 TABLET | Refills: 0 | Status: SHIPPED | OUTPATIENT
Start: 2023-10-19 | End: 2024-03-07

## 2023-10-19 ASSESSMENT — FIBROSIS 4 INDEX: FIB4 SCORE: 0.69

## 2023-10-19 NOTE — LETTER
October 19, 2023         Patient: Jazmine Reinoso   YOB: 1985   Date of Visit: 10/19/2023           To Whom it May Concern:    Jazmine Reinoso was seen in my clinic on 10/19/2023. Patient has symptoms and  physical exam findings suggestive of overuse syndrome with bilateral wrist tendinitis/carpal tunnel.  Please allow her to do light duty and avoid repetitive motions with her hands until she is able to see specialty care.  Referral was given today.  If you have any questions or concerns, please don't hesitate to call.        Sincerely,           Fransisca Carranza P.A.-C.  Electronically Signed

## 2023-10-20 NOTE — ASSESSMENT & PLAN NOTE
Chronic and unstable  I do feel she has overuse syndrome exacerbated by repetitive motion at work.  Symptoms are consistent with carpal tunnel.  Please take anti-inflammatories, wear brace and follow-up with sports medicine Dr. Lee

## 2023-10-20 NOTE — PROGRESS NOTES
"Subjective:   Jazmine Reinoso is a 37 y.o. female here today for     HPI: Patient is here to discuss:  Still reports bilateral wrist pain with repetitive motion at work.  Has not been using a brace.  Parents do not help that much.  Feels very achy and decreased range of motion bilateral wrist region      Current medicines (including changes today)  Current Outpatient Medications   Medication Sig Dispense Refill    naproxen (NAPROSYN) 500 MG Tab Take 1 Tablet by mouth 2 times a day with meals. 30 Tablet 0    fluticasone furoate-vilanterol (BREO ELLIPTA) 100-25 MCG/ACT AEROSOL POWDER, BREATH ACTIVATED Inhale one puff daily and rinse mouth after 28 Each 5    albuterol 108 (90 Base) MCG/ACT Aero Soln inhalation aerosol Inhale 1-2 Puffs every four hours as needed for Shortness of Breath. 1 Each 1    Nebulizers (COMPRESSOR/NEBULIZER) Misc Use nebulizer and instill albuterol nebulizer liquid every 4-6 hours as needed for wheezing 1 Each 0    albuterol (PROVENTIL) 2.5mg/3ml Nebu Soln solution for nebulization Take 3 mL by nebulization every four hours as needed for Shortness of Breath. 25 Each 2     No current facility-administered medications for this visit.     She  has a past medical history of Herpes (2019).  Amoxicillin and Pcn [penicillins]     Social History and Family History were reviewed and updated.    ROS   No headaches, chest pain, no shortness of breath, abdominal pain, nausea, or vomiting.  All other systems were reviewed and are negative or noted as positive in the HPI.       Objective:     /78   Pulse 94   Temp 36.7 °C (98 °F) (Temporal)   Resp 20   Ht 1.626 m (5' 4\")   Wt 61 kg (134 lb 8 oz)   SpO2 98%  Body mass index is 23.09 kg/m².     Physical Exam:  General: Patient appears well-nourished, well-hydrated, nontoxic  HEENT, normocephalic atraumatic, PERRLA, extraocular movements intact, nares are patent and clear  Neck: No visible masses, thyromegaly or abnormalities " noted  Cardiovascular.  Sitting comfortably without visible signs of edema  Lungs: No cyanosis noted, nondyspneic  Skin: Well perfused without evidence of rash or lesions  Neurological: Cranial nerves II through XII intact, normal gait  Musculoskeletal: Normal range of motion, normal strength and no deficit noted     Clinical Course/Lab Analysis:        Assessment and Plan:   The following treatment plan was discussed.  Signs and symptoms for which to return were discussed with patient at length.  Patient verbalized understanding.    Problem List Items Addressed This Visit       Wrist tendonitis     Chronic and unstable  I do feel she has overuse syndrome exacerbated by repetitive motion at work.  Symptoms are consistent with carpal tunnel.  Please take anti-inflammatories, wear brace and follow-up with sports medicine Dr. Lee         Relevant Medications    naproxen (NAPROSYN) 500 MG Tab    Other Relevant Orders    Referral to Sports Medicine      Per her request note for work given for light duty until she sees specialty care    Followup: After sports medicine visit    Please note that this dictation was created using voice recognition software. I have made every reasonable attempt to correct obvious errors, but I expect that there are errors of grammar and possibly content that I did not discover before finalizing the note.

## 2023-11-06 ENCOUNTER — OFFICE VISIT (OUTPATIENT)
Dept: MEDICAL GROUP | Facility: MEDICAL CENTER | Age: 38
End: 2023-11-06
Payer: COMMERCIAL

## 2023-11-06 VITALS
OXYGEN SATURATION: 97 % | WEIGHT: 136 LBS | HEART RATE: 73 BPM | RESPIRATION RATE: 20 BRPM | SYSTOLIC BLOOD PRESSURE: 116 MMHG | DIASTOLIC BLOOD PRESSURE: 74 MMHG | TEMPERATURE: 97 F | HEIGHT: 64 IN | BODY MASS INDEX: 23.22 KG/M2

## 2023-11-06 DIAGNOSIS — J40 BRONCHITIS: ICD-10-CM

## 2023-11-06 PROCEDURE — 3078F DIAST BP <80 MM HG: CPT | Performed by: PHYSICIAN ASSISTANT

## 2023-11-06 PROCEDURE — 3074F SYST BP LT 130 MM HG: CPT | Performed by: PHYSICIAN ASSISTANT

## 2023-11-06 PROCEDURE — 99214 OFFICE O/P EST MOD 30 MIN: CPT | Performed by: PHYSICIAN ASSISTANT

## 2023-11-06 RX ORDER — DOXYCYCLINE HYCLATE 100 MG
100 TABLET ORAL 2 TIMES DAILY
Qty: 20 TABLET | Refills: 0 | Status: SHIPPED | OUTPATIENT
Start: 2023-11-06 | End: 2023-11-16

## 2023-11-06 RX ORDER — PREDNISONE 20 MG/1
TABLET ORAL
Qty: 10 TABLET | Refills: 0 | Status: SHIPPED | OUTPATIENT
Start: 2023-11-06 | End: 2024-03-07

## 2023-11-06 ASSESSMENT — FIBROSIS 4 INDEX: FIB4 SCORE: 0.69

## 2023-11-06 NOTE — PROGRESS NOTES
"Subjective:   Jazmine Reinoso is a 37 y.o. female here today for     HPI:Patient is here to discuss: Cough cold    Patient comes in with approximately week history of sinus congestion, postnasal drip and some green discharge from the nose.  Has a history of asthma and has been coughing as well.  No fevers, chills, nauseousness, vomiting, abdominal pain or diarrhea no shortness of breath      Current medicines (including changes today)  Current Outpatient Medications   Medication Sig Dispense Refill    predniSONE (DELTASONE) 20 MG Tab Take one pill twice a day for five days 10 Tablet 0    doxycycline (VIBRAMYCIN) 100 MG Tab Take 1 Tablet by mouth 2 times a day for 10 days. 20 Tablet 0    naproxen (NAPROSYN) 500 MG Tab Take 1 Tablet by mouth 2 times a day with meals. 30 Tablet 0    fluticasone furoate-vilanterol (BREO ELLIPTA) 100-25 MCG/ACT AEROSOL POWDER, BREATH ACTIVATED Inhale one puff daily and rinse mouth after 28 Each 5    albuterol 108 (90 Base) MCG/ACT Aero Soln inhalation aerosol Inhale 1-2 Puffs every four hours as needed for Shortness of Breath. 1 Each 1    Nebulizers (COMPRESSOR/NEBULIZER) Misc Use nebulizer and instill albuterol nebulizer liquid every 4-6 hours as needed for wheezing 1 Each 0    albuterol (PROVENTIL) 2.5mg/3ml Nebu Soln solution for nebulization Take 3 mL by nebulization every four hours as needed for Shortness of Breath. 25 Each 2     No current facility-administered medications for this visit.     She  has a past medical history of Herpes (2019).  Amoxicillin and Pcn [penicillins]     Social History and Family History were reviewed and updated.    ROS   No headaches, chest pain, no shortness of breath, abdominal pain, nausea, or vomiting.  All other systems were reviewed and are negative or noted as positive in the HPI.       Objective:     /74   Pulse 73   Temp 36.1 °C (97 °F)   Resp 20   Ht 1.626 m (5' 4\")   Wt 61.7 kg (136 lb)   SpO2 97%  Body mass index is 23.34 " kg/m².     Physical Exam:  General: Patient appears well-nourished, well-hydrated, nontoxic  HEENT, normocephalic atraumatic, PERRLA, extraocular movements intact, nares are patent and clear  Neck: No visible masses, thyromegaly or abnormalities noted  Cardiovascular.  Sitting comfortably without visible signs of edema  Lungs: No cyanosis noted, nondyspneic  Skin: Well perfused without evidence of rash or lesions  Neurological: Cranial nerves II through XII intact, normal gait  Musculoskeletal: Normal range of motion, normal strength and no deficit noted     Clinical Course/Lab Analysis:        Assessment and Plan:   The following treatment plan was discussed.  Signs and symptoms for which to return were discussed with patient at length.  Patient verbalized understanding.    Problem List Items Addressed This Visit    None  Visit Diagnoses       Bronchitis        Relevant Medications    predniSONE (DELTASONE) 20 MG Tab    doxycycline (VIBRAMYCIN) 100 MG Tab             New condition that is unstable, likely viral etiology.  Usually last about 10 to 14 days.  However given her history I am rosa isela cover her with an antibiotic to cover for a bacterial superinfection and she is instructed to follow-up if symptoms persist or worsen despite treatment    Followup: As needed    Please note that this dictation was created using voice recognition software. I have made every reasonable attempt to correct obvious errors, but I expect that there are errors of grammar and possibly content that I did not discover before finalizing the note.

## 2023-11-28 ENCOUNTER — OFFICE VISIT (OUTPATIENT)
Dept: SPORTS MEDICINE | Facility: CLINIC | Age: 38
End: 2023-11-28
Attending: PHYSICIAN ASSISTANT
Payer: COMMERCIAL

## 2023-11-28 VITALS
RESPIRATION RATE: 18 BRPM | HEART RATE: 98 BPM | WEIGHT: 136 LBS | SYSTOLIC BLOOD PRESSURE: 122 MMHG | DIASTOLIC BLOOD PRESSURE: 82 MMHG | TEMPERATURE: 98.3 F | OXYGEN SATURATION: 96 % | HEIGHT: 64 IN | BODY MASS INDEX: 23.22 KG/M2

## 2023-11-28 DIAGNOSIS — G56.03 BILATERAL CARPAL TUNNEL SYNDROME: ICD-10-CM

## 2023-11-28 PROCEDURE — 99214 OFFICE O/P EST MOD 30 MIN: CPT | Performed by: FAMILY MEDICINE

## 2023-11-28 PROCEDURE — 3079F DIAST BP 80-89 MM HG: CPT | Performed by: FAMILY MEDICINE

## 2023-11-28 PROCEDURE — 3074F SYST BP LT 130 MM HG: CPT | Performed by: FAMILY MEDICINE

## 2023-11-28 SDOH — ECONOMIC STABILITY: FOOD INSECURITY: WITHIN THE PAST 12 MONTHS, YOU WORRIED THAT YOUR FOOD WOULD RUN OUT BEFORE YOU GOT MONEY TO BUY MORE.: NEVER TRUE

## 2023-11-28 SDOH — ECONOMIC STABILITY: INCOME INSECURITY: IN THE LAST 12 MONTHS, WAS THERE A TIME WHEN YOU WERE NOT ABLE TO PAY THE MORTGAGE OR RENT ON TIME?: NO

## 2023-11-28 SDOH — ECONOMIC STABILITY: TRANSPORTATION INSECURITY
IN THE PAST 12 MONTHS, HAS THE LACK OF TRANSPORTATION KEPT YOU FROM MEDICAL APPOINTMENTS OR FROM GETTING MEDICATIONS?: NO

## 2023-11-28 SDOH — ECONOMIC STABILITY: HOUSING INSECURITY

## 2023-11-28 SDOH — HEALTH STABILITY: PHYSICAL HEALTH: ON AVERAGE, HOW MANY MINUTES DO YOU ENGAGE IN EXERCISE AT THIS LEVEL?: 60 MIN

## 2023-11-28 SDOH — ECONOMIC STABILITY: HOUSING INSECURITY
IN THE LAST 12 MONTHS, WAS THERE A TIME WHEN YOU DID NOT HAVE A STEADY PLACE TO SLEEP OR SLEPT IN A SHELTER (INCLUDING NOW)?: NO

## 2023-11-28 SDOH — HEALTH STABILITY: MENTAL HEALTH
STRESS IS WHEN SOMEONE FEELS TENSE, NERVOUS, ANXIOUS, OR CAN'T SLEEP AT NIGHT BECAUSE THEIR MIND IS TROUBLED. HOW STRESSED ARE YOU?: NOT AT ALL

## 2023-11-28 SDOH — HEALTH STABILITY: PHYSICAL HEALTH: ON AVERAGE, HOW MANY DAYS PER WEEK DO YOU ENGAGE IN MODERATE TO STRENUOUS EXERCISE (LIKE A BRISK WALK)?: 4 DAYS

## 2023-11-28 SDOH — ECONOMIC STABILITY: HOUSING INSECURITY: IN THE LAST 12 MONTHS, HOW MANY PLACES HAVE YOU LIVED?: 1

## 2023-11-28 SDOH — ECONOMIC STABILITY: TRANSPORTATION INSECURITY
IN THE PAST 12 MONTHS, HAS LACK OF RELIABLE TRANSPORTATION KEPT YOU FROM MEDICAL APPOINTMENTS, MEETINGS, WORK OR FROM GETTING THINGS NEEDED FOR DAILY LIVING?: NO

## 2023-11-28 SDOH — ECONOMIC STABILITY: FOOD INSECURITY: WITHIN THE PAST 12 MONTHS, THE FOOD YOU BOUGHT JUST DIDN'T LAST AND YOU DIDN'T HAVE MONEY TO GET MORE.: PATIENT DECLINED

## 2023-11-28 SDOH — ECONOMIC STABILITY: INCOME INSECURITY: HOW HARD IS IT FOR YOU TO PAY FOR THE VERY BASICS LIKE FOOD, HOUSING, MEDICAL CARE, AND HEATING?: NOT HARD AT ALL

## 2023-11-28 SDOH — ECONOMIC STABILITY: TRANSPORTATION INSECURITY
IN THE PAST 12 MONTHS, HAS LACK OF TRANSPORTATION KEPT YOU FROM MEETINGS, WORK, OR FROM GETTING THINGS NEEDED FOR DAILY LIVING?: NO

## 2023-11-28 ASSESSMENT — SOCIAL DETERMINANTS OF HEALTH (SDOH)
WITHIN THE PAST 12 MONTHS, YOU WORRIED THAT YOUR FOOD WOULD RUN OUT BEFORE YOU GOT THE MONEY TO BUY MORE: NEVER TRUE
HOW OFTEN DO YOU ATTEND CHURCH OR RELIGIOUS SERVICES?: NEVER
HOW OFTEN DO YOU GET TOGETHER WITH FRIENDS OR RELATIVES?: THREE TIMES A WEEK
ARE YOU MARRIED, WIDOWED, DIVORCED, SEPARATED, NEVER MARRIED, OR LIVING WITH A PARTNER?: NEVER MARRIED
HOW OFTEN DO YOU ATTENT MEETINGS OF THE CLUB OR ORGANIZATION YOU BELONG TO?: NEVER
DO YOU BELONG TO ANY CLUBS OR ORGANIZATIONS SUCH AS CHURCH GROUPS UNIONS, FRATERNAL OR ATHLETIC GROUPS, OR SCHOOL GROUPS?: NO
HOW OFTEN DO YOU ATTENT MEETINGS OF THE CLUB OR ORGANIZATION YOU BELONG TO?: NEVER
IN A TYPICAL WEEK, HOW MANY TIMES DO YOU TALK ON THE PHONE WITH FAMILY, FRIENDS, OR NEIGHBORS?: THREE TIMES A WEEK
HOW OFTEN DO YOU GET TOGETHER WITH FRIENDS OR RELATIVES?: THREE TIMES A WEEK
HOW OFTEN DO YOU HAVE SIX OR MORE DRINKS ON ONE OCCASION: NEVER
HOW OFTEN DO YOU HAVE A DRINK CONTAINING ALCOHOL: NEVER
DO YOU BELONG TO ANY CLUBS OR ORGANIZATIONS SUCH AS CHURCH GROUPS UNIONS, FRATERNAL OR ATHLETIC GROUPS, OR SCHOOL GROUPS?: NO
HOW HARD IS IT FOR YOU TO PAY FOR THE VERY BASICS LIKE FOOD, HOUSING, MEDICAL CARE, AND HEATING?: NOT HARD AT ALL
IN A TYPICAL WEEK, HOW MANY TIMES DO YOU TALK ON THE PHONE WITH FAMILY, FRIENDS, OR NEIGHBORS?: THREE TIMES A WEEK
ARE YOU MARRIED, WIDOWED, DIVORCED, SEPARATED, NEVER MARRIED, OR LIVING WITH A PARTNER?: NEVER MARRIED
HOW MANY DRINKS CONTAINING ALCOHOL DO YOU HAVE ON A TYPICAL DAY WHEN YOU ARE DRINKING: PATIENT DOES NOT DRINK
HOW OFTEN DO YOU ATTEND CHURCH OR RELIGIOUS SERVICES?: NEVER

## 2023-11-28 ASSESSMENT — ENCOUNTER SYMPTOMS
NAUSEA: 0
SHORTNESS OF BREATH: 0
DIZZINESS: 0
CHILLS: 0
FEVER: 0
VOMITING: 0

## 2023-11-28 ASSESSMENT — FIBROSIS 4 INDEX: FIB4 SCORE: 0.71

## 2023-11-28 ASSESSMENT — LIFESTYLE VARIABLES
SKIP TO QUESTIONS 9-10: 1
HOW OFTEN DO YOU HAVE SIX OR MORE DRINKS ON ONE OCCASION: NEVER
AUDIT-C TOTAL SCORE: 0
HOW MANY STANDARD DRINKS CONTAINING ALCOHOL DO YOU HAVE ON A TYPICAL DAY: PATIENT DOES NOT DRINK
HOW OFTEN DO YOU HAVE A DRINK CONTAINING ALCOHOL: NEVER

## 2023-11-28 NOTE — PROGRESS NOTES
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   Respiratory:  Negative for shortness of breath.    Cardiovascular:  Negative for chest pain.   Gastrointestinal:  Negative for nausea and vomiting.   Neurological:  Negative for dizziness.     PMH:  has a past medical history of Herpes (2019).  MEDS:   Current Outpatient Medications:     predniSONE (DELTASONE) 20 MG Tab, Take one pill twice a day for five days, Disp: 10 Tablet, Rfl: 0    naproxen (NAPROSYN) 500 MG Tab, Take 1 Tablet by mouth 2 times a day with meals., Disp: 30 Tablet, Rfl: 0    fluticasone furoate-vilanterol (BREO ELLIPTA) 100-25 MCG/ACT AEROSOL POWDER, BREATH ACTIVATED, Inhale one puff daily and rinse mouth after, Disp: 28 Each, Rfl: 5    albuterol 108 (90 Base) MCG/ACT Aero Soln inhalation aerosol, Inhale 1-2 Puffs every four hours as needed for Shortness of Breath., Disp: 1 Each, Rfl: 1    Nebulizers (COMPRESSOR/NEBULIZER) Misc, Use nebulizer and instill albuterol nebulizer liquid every 4-6 hours as needed for wheezing, Disp: 1 Each, Rfl: " "0    albuterol (PROVENTIL) 2.5mg/3ml Nebu Soln solution for nebulization, Take 3 mL by nebulization every four hours as needed for Shortness of Breath., Disp: 25 Each, Rfl: 2  ALLERGIES:   Allergies   Allergen Reactions    Amoxicillin     Pcn [Penicillins]      SURGHX: History reviewed. No pertinent surgical history.  SOCHX:  reports that she quit smoking about 13 years ago. Her smoking use included cigarettes. She has never used smokeless tobacco. She reports that she does not currently use alcohol. She reports that she does not currently use drugs after having used the following drugs: Inhaled.  FH: Family history was reviewed, no pertinent findings to report    Objective   /82 (BP Location: Left arm, Patient Position: Sitting, BP Cuff Size: Adult)   Pulse 98   Temp 36.8 °C (98.3 °F) (Temporal)   Resp 18   Ht 1.626 m (5' 4\")   Wt 61.7 kg (136 lb)   SpO2 96%   BMI 23.34 kg/m²     Hand exam    NAD  Alert and oriented    Spurling testing NEGATIVE, although she has some discomfort when going to the RIGHT side  Bilateral upper extremity strength is intact  She does have some weak  strength with BOTH hands    BILATERAL ELBOW exam  Range of motion intact  No swelling  No tenderness the medial or lateral epicondyle  Navarrete test NEGATIVE    BILATERAL WRIST exam  Range of motion intact  No tenderness along scaphoid, TFCC insertion, distal radius or distal ulna  Tinel's testing is POSITIVE along the carpal tunnel, worse on the LEFT compared to the right  Phalen's testing was POSITIVE on the LEFT and mildly positive on the right  The hand is otherwise neurovascularly intact    1. Bilateral carpal tunnel syndrome  TSH WITH REFLEX TO FT4    THYROID PEROXIDASE  (TPO) AB    Basic Metabolic Panel    HEMOGLOBIN A1C        Symptoms began approximately 3 months ago (July 2023)  Pain is predominantly along the dorsal aspect of the hands  Hands \"feel swollen\", but do not necessarily look swollen    Check thyroid and " glucose Labs given the presence of BILATERAL CTS symptoms with family history of thyroid disease    Bilateral wrist splints provided  Recommend light duty for work until reevaluation in 3 weeks  Also recommend she uses her wrist splints    Return in about 3 weeks (around 12/19/2023).  To see how she is doing with her wrist splints, review labs at that point  We may consider CTS corticosteroid injection if symptoms persist or fail to improve    Imaging is NOT been performed    Thank you Fransisca Carranza PA-C for allowing me to participate in caring for your patient.

## 2023-11-28 NOTE — LETTER
November 28, 2023         Patient: Jazmine Reinoso   YOB: 1985   Date of Visit: 11/28/2023           To Whom it May Concern:    Jazmine Reinoso was seen in my clinic on 11/28/2023. 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      If you have any questions or concerns, please don't hesitate to call.        Sincerely,           Rajiv Lee M.D.  Electronically Signed

## 2023-11-28 NOTE — Clinical Note
Hong Gongora, We saw Jazmine in the sports medicine clinic for her bilateral CTS symptoms.  She does have a family history of thyroid dysfunction so we ordered some labs, provided her with some response and plan to see her back in about 3 weeks to see how things are coming along.  Hope you are well! Respectfully,  MADDIE Lee M.D. [facility] [phone]

## 2023-11-28 NOTE — LETTER
November 28, 2023         Patient: Jazmine Reinoso   YOB: 1985   Date of Visit: 11/28/2023           To Whom it May Concern:a    Jazmine Reinoso was seen in my clinic on 11/28/2023.  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    If you have any questions or concerns, please don't hesitate to call.        Sincerely,           Rajiv Lee M.D.  Electronically Signed

## 2023-12-11 ENCOUNTER — OFFICE VISIT (OUTPATIENT)
Dept: MEDICAL GROUP | Facility: MEDICAL CENTER | Age: 38
End: 2023-12-11
Payer: COMMERCIAL

## 2023-12-11 VITALS
SYSTOLIC BLOOD PRESSURE: 119 MMHG | OXYGEN SATURATION: 97 % | WEIGHT: 134 LBS | HEIGHT: 64 IN | BODY MASS INDEX: 22.88 KG/M2 | DIASTOLIC BLOOD PRESSURE: 78 MMHG | HEART RATE: 74 BPM | RESPIRATION RATE: 20 BRPM | TEMPERATURE: 99 F

## 2023-12-11 DIAGNOSIS — J34.89 NASAL DISCOMFORT: ICD-10-CM

## 2023-12-11 DIAGNOSIS — J30.0 VASOMOTOR RHINITIS: ICD-10-CM

## 2023-12-11 PROCEDURE — 3078F DIAST BP <80 MM HG: CPT | Performed by: PHYSICIAN ASSISTANT

## 2023-12-11 PROCEDURE — 3074F SYST BP LT 130 MM HG: CPT | Performed by: PHYSICIAN ASSISTANT

## 2023-12-11 PROCEDURE — 99214 OFFICE O/P EST MOD 30 MIN: CPT | Performed by: PHYSICIAN ASSISTANT

## 2023-12-11 RX ORDER — AZELASTINE 1 MG/ML
1 SPRAY, METERED NASAL 2 TIMES DAILY
Qty: 30 ML | Refills: 1 | Status: SHIPPED | OUTPATIENT
Start: 2023-12-11 | End: 2024-01-08

## 2023-12-11 ASSESSMENT — FIBROSIS 4 INDEX: FIB4 SCORE: 0.71

## 2023-12-11 NOTE — PROGRESS NOTES
Subjective:   Jazmine Reinoso is a 38 y.o. female here today for     HPI:Patient is here to discuss: Runny nose with congestion and scabs in the nose    Patient comes in with a 2 to 3-week history of acute runny nose clear in nature and notices that she has self-described boogers in her nose and sores that are scabbed.  Denies congestion, cough, fevers or chills      Current medicines (including changes today)  Current Outpatient Medications   Medication Sig Dispense Refill    mupirocin (BACTROBAN) 2 % Ointment Apply 1 Application topically 3 times a day for 7 days. 30 g 1    azelastine (ASTELIN) 137 MCG/SPRAY nasal spray Administer 1 Spray into affected nostril(S) 2 times a day. 30 mL 1    predniSONE (DELTASONE) 20 MG Tab Take one pill twice a day for five days 10 Tablet 0    naproxen (NAPROSYN) 500 MG Tab Take 1 Tablet by mouth 2 times a day with meals. 30 Tablet 0    fluticasone furoate-vilanterol (BREO ELLIPTA) 100-25 MCG/ACT AEROSOL POWDER, BREATH ACTIVATED Inhale one puff daily and rinse mouth after 28 Each 5    albuterol 108 (90 Base) MCG/ACT Aero Soln inhalation aerosol Inhale 1-2 Puffs every four hours as needed for Shortness of Breath. 1 Each 1    Nebulizers (COMPRESSOR/NEBULIZER) Misc Use nebulizer and instill albuterol nebulizer liquid every 4-6 hours as needed for wheezing 1 Each 0    albuterol (PROVENTIL) 2.5mg/3ml Nebu Soln solution for nebulization Take 3 mL by nebulization every four hours as needed for Shortness of Breath. 25 Each 2     No current facility-administered medications for this visit.     She  has a past medical history of Herpes (2019).  Amoxicillin and Pcn [penicillins]     Social History and Family History were reviewed and updated.    ROS   No headaches, chest pain, no shortness of breath, abdominal pain, nausea, or vomiting.  All other systems were reviewed and are negative or noted as positive in the HPI.       Objective:     /78   Pulse 74   Temp 37.2 °C (99 °F)  "(Temporal)   Resp 20   Ht 1.626 m (5' 4\")   Wt 60.8 kg (134 lb)   SpO2 97%  Body mass index is 23 kg/m².     Physical Exam:  General: Patient appears well-nourished, well-hydrated, nontoxic  HEENT, normocephalic atraumatic, PERRLA, extraocular movements intact, nares are patent and clear  Neck: No visible masses, thyromegaly or abnormalities noted  Cardiovascular.  Sitting comfortably without visible signs of edema  Lungs: No cyanosis noted, nondyspneic  Skin: Well perfused without evidence of rash or lesions  Neurological: Cranial nerves II through XII intact, normal gait  Musculoskeletal: Normal range of motion, normal strength and no deficit noted   Nares are bilaterally edematous and slightly erythematous.  She has some anterior crusting and redness noted at the tip of her naries  Clinical Course/Lab Analysis:        Assessment and Plan:   The following treatment plan was discussed.  Signs and symptoms for which to return were discussed with patient at length.  Patient verbalized understanding.    Problem List Items Addressed This Visit    None  Visit Diagnoses       Nasal discomfort        Relevant Medications    mupirocin (BACTROBAN) 2 % Ointment    Vasomotor rhinitis        Relevant Medications    azelastine (ASTELIN) 137 MCG/SPRAY nasal spray             New and unstable conditions: Likely has vasomotor rhinitis with associated irritation.  I Debra give her mupirocin to use 2-3 times daily for a week, please use saline spray and can use an emollient such as K-Y jelly to help prevent breakdown.  Azelastine as needed after for prevention of vasomotor rhinitis follow-up if symptoms persist or worsen despite treatment      Followup: As needed    Please note that this dictation was created using voice recognition software. I have made every reasonable attempt to correct obvious errors, but I expect that there are errors of grammar and possibly content that I did not discover before finalizing the note.       "

## 2023-12-21 ENCOUNTER — OFFICE VISIT (OUTPATIENT)
Dept: SPORTS MEDICINE | Facility: CLINIC | Age: 38
End: 2023-12-21
Payer: COMMERCIAL

## 2023-12-21 VITALS
HEART RATE: 78 BPM | SYSTOLIC BLOOD PRESSURE: 116 MMHG | TEMPERATURE: 98.3 F | HEIGHT: 64 IN | OXYGEN SATURATION: 97 % | WEIGHT: 134 LBS | BODY MASS INDEX: 22.88 KG/M2 | RESPIRATION RATE: 16 BRPM | DIASTOLIC BLOOD PRESSURE: 76 MMHG

## 2023-12-21 DIAGNOSIS — G56.03 BILATERAL CARPAL TUNNEL SYNDROME: ICD-10-CM

## 2023-12-21 PROCEDURE — 3074F SYST BP LT 130 MM HG: CPT | Performed by: FAMILY MEDICINE

## 2023-12-21 PROCEDURE — 99213 OFFICE O/P EST LOW 20 MIN: CPT | Performed by: FAMILY MEDICINE

## 2023-12-21 PROCEDURE — 3078F DIAST BP <80 MM HG: CPT | Performed by: FAMILY MEDICINE

## 2023-12-21 ASSESSMENT — FIBROSIS 4 INDEX: FIB4 SCORE: 0.71

## 2023-12-21 NOTE — PROGRESS NOTES
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  Mildly improved with rest and having some time off, but the pain still persists even on her 3-day weekends  She does experience night symptoms affecting the \"entire hand\" BILATERALLY and symmetrically  Denies cervical spine pain  Feeling better since she has been on light duty    Update:  Tolerated splinting for about 10 days and experienced a burning sensation in the hands as well as pins-and-needles feeling with the brace despite flattening the support  She discontinued the brace  She did NOT get her blood work done    [employer], and works as a  as well (2nd job)  Walking her dogs, skateboarding, hiking    Objective   /76 (BP Location: Left arm, Patient Position: Sitting, BP Cuff Size: Adult)   Pulse 78   Temp 36.8 °C (98.3 °F) (Temporal)   Resp 16   Ht 1.626 m (5' 4\")   Wt 60.8 kg (134 lb)   SpO2 97%   BMI 23.00 kg/m²       Hand exam    NAD  Alert and oriented    Spurling testing NEGATIVE, although she has some discomfort when going to the RIGHT side  Bilateral upper extremity strength is intact  She does have some weak  strength with BOTH hands    BILATERAL ELBOW exam  Range of motion intact  No swelling  No tenderness the medial or lateral epicondyle  Navarrete test NEGATIVE    BILATERAL WRIST exam  Range of motion intact  No tenderness along scaphoid, TFCC insertion, distal radius or distal ulna  Tinel's testing is POSITIVE along the carpal tunnel, worse on the LEFT " "compared to the right  Phalen's testing was POSITIVE on the LEFT and mildly positive on the right  The hand is otherwise neurovascularly intact    1. Bilateral carpal tunnel syndrome          Symptoms began approximately 3 months ago (July 2023)  Pain is predominantly along the dorsal aspect of the hands  Hands \"feel swollen\", but do not necessarily look swollen    Check thyroid and glucose Labs given the presence of BILATERAL CTS symptoms with family history of thyroid disease    Bilateral wrist splints provided  Recommend light duty for work until reevaluation in 3 weeks  Also recommend she uses her wrist splints    To see how she is doing with her wrist splints, review labs at that point  We may consider CTS corticosteroid injection if symptoms persist or fail to improve    Imaging is NOT been performed    She has had fluctuation in weight/increase according to her medical record  Recommend she obtain her blood work as ordered to see if she has thyroid involvement    Thank you Fransisca Carranza PA-C for allowing me to participate in caring for your patient.  "

## 2023-12-21 NOTE — LETTER
December 21, 2023         Patient: Jazmine Reinoso   YOB: 1985   Date of Visit: 12/21/2023           To Whom it May Concern:    Jazmine Reinoso was seen in my clinic on 12/21/2023. 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     If you have any questions or concerns, please don't hesitate to call.        Sincerely,           Rajiv Lee M.D.  Electronically Signed

## 2024-01-02 DIAGNOSIS — J30.0 VASOMOTOR RHINITIS: ICD-10-CM

## 2024-01-08 RX ORDER — AZELASTINE HYDROCHLORIDE 137 UG/1
SPRAY, METERED NASAL
Qty: 90 ML | Refills: 3 | Status: SHIPPED | OUTPATIENT
Start: 2024-01-08 | End: 2024-03-07

## 2024-01-08 NOTE — TELEPHONE ENCOUNTER
Received request via: Pharmacy    Was the patient seen in the last year in this department? Yes    Does the patient have an active prescription (recently filled or refills available) for medication(s) requested? No    Does the patient have nursing home Plus and need 100 day supply (blood pressure, diabetes and cholesterol meds only)? Patient does not have SCP    Pharmacy comment: REQUEST FOR 90 DAYS PRESCRIPTION. DX Code Needed.

## 2024-01-10 DIAGNOSIS — J34.89 NASAL DISCOMFORT: ICD-10-CM

## 2024-01-15 ENCOUNTER — HOSPITAL ENCOUNTER (OUTPATIENT)
Dept: LAB | Facility: MEDICAL CENTER | Age: 39
End: 2024-01-15
Attending: STUDENT IN AN ORGANIZED HEALTH CARE EDUCATION/TRAINING PROGRAM
Payer: COMMERCIAL

## 2024-01-15 DIAGNOSIS — G56.03 BILATERAL CARPAL TUNNEL SYNDROME: ICD-10-CM

## 2024-01-15 LAB
ANION GAP SERPL CALC-SCNC: 10 MMOL/L (ref 7–16)
BUN SERPL-MCNC: 9 MG/DL (ref 8–22)
CALCIUM SERPL-MCNC: 9 MG/DL (ref 8.5–10.5)
CHLORIDE SERPL-SCNC: 103 MMOL/L (ref 96–112)
CO2 SERPL-SCNC: 26 MMOL/L (ref 20–33)
CREAT SERPL-MCNC: 0.69 MG/DL (ref 0.5–1.4)
EST. AVERAGE GLUCOSE BLD GHB EST-MCNC: 97 MG/DL
GFR SERPLBLD CREATININE-BSD FMLA CKD-EPI: 115 ML/MIN/1.73 M 2
GLUCOSE SERPL-MCNC: 76 MG/DL (ref 65–99)
HBA1C MFR BLD: 5 % (ref 4–5.6)
POTASSIUM SERPL-SCNC: 3.8 MMOL/L (ref 3.6–5.5)
SODIUM SERPL-SCNC: 139 MMOL/L (ref 135–145)
THYROPEROXIDASE AB SERPL-ACNC: <9 IU/ML (ref 0–9)
TSH SERPL DL<=0.005 MIU/L-ACNC: 1.65 UIU/ML (ref 0.38–5.33)

## 2024-01-15 PROCEDURE — 36415 COLL VENOUS BLD VENIPUNCTURE: CPT

## 2024-01-15 PROCEDURE — 86376 MICROSOMAL ANTIBODY EACH: CPT

## 2024-01-15 PROCEDURE — 80048 BASIC METABOLIC PNL TOTAL CA: CPT

## 2024-01-15 PROCEDURE — 83036 HEMOGLOBIN GLYCOSYLATED A1C: CPT

## 2024-01-15 PROCEDURE — 84443 ASSAY THYROID STIM HORMONE: CPT

## 2024-01-22 ENCOUNTER — APPOINTMENT (OUTPATIENT)
Dept: SPORTS MEDICINE | Facility: CLINIC | Age: 39
End: 2024-01-22
Payer: COMMERCIAL

## 2024-01-29 ENCOUNTER — OFFICE VISIT (OUTPATIENT)
Dept: SPORTS MEDICINE | Facility: CLINIC | Age: 39
End: 2024-01-29
Payer: COMMERCIAL

## 2024-01-29 VITALS
OXYGEN SATURATION: 94 % | BODY MASS INDEX: 23.64 KG/M2 | HEIGHT: 64 IN | TEMPERATURE: 98.9 F | SYSTOLIC BLOOD PRESSURE: 110 MMHG | HEART RATE: 87 BPM | DIASTOLIC BLOOD PRESSURE: 60 MMHG | WEIGHT: 138.5 LBS | RESPIRATION RATE: 16 BRPM

## 2024-01-29 DIAGNOSIS — G56.03 BILATERAL CARPAL TUNNEL SYNDROME: ICD-10-CM

## 2024-01-29 PROCEDURE — 99213 OFFICE O/P EST LOW 20 MIN: CPT | Performed by: FAMILY MEDICINE

## 2024-01-29 PROCEDURE — 3078F DIAST BP <80 MM HG: CPT | Performed by: FAMILY MEDICINE

## 2024-01-29 PROCEDURE — 3074F SYST BP LT 130 MM HG: CPT | Performed by: FAMILY MEDICINE

## 2024-01-29 ASSESSMENT — FIBROSIS 4 INDEX: FIB4 SCORE: 0.71

## 2024-01-29 NOTE — LETTER
January 29, 2024         Patient: Jazmine Reinoso   YOB: 1985   Date of Visit: 1/29/2024           To Whom it May Concern:    Jazmine Reinoso was seen in my clinic on 1/29/2024. 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     If you have any questions or concerns, please don't hesitate to call.        Sincerely,           Rajiv Lee M.D.  Electronically Signed

## 2024-01-29 NOTE — PROGRESS NOTES
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  Mildly improved with rest and having some time off, but the pain still persists even on her 3-day weekends  She does experience night symptoms affecting the \"entire hand\" BILATERALLY and symmetrically  Denies cervical spine pain  Feeling better since she has been on light duty    Update:  Still splinting  Symptoms PERSIST  She had her lab work done    [employer], and works as a  as well (2nd job)  Walking her dogs, skateboarding, hiking    Objective   /60 (BP Location: Left arm, Patient Position: Sitting, BP Cuff Size: Adult)   Pulse 87   Temp 37.2 °C (98.9 °F) (Temporal)   Resp 16   Ht 1.626 m (5' 4\")   Wt 62.8 kg (138 lb 8 oz)   SpO2 94%   BMI 23.77 kg/m²     Hand exam    NAD  Alert and oriented    Spurling testing NEGATIVE, although she has some discomfort when going to the RIGHT side  Bilateral upper extremity strength is intact  She does have some weak  strength with BOTH hands    BILATERAL ELBOW exam  Range of motion intact  No swelling  No tenderness the medial or lateral epicondyle  Navarrete test NEGATIVE    BILATERAL WRIST exam  Range of motion intact  No tenderness along scaphoid, TFCC insertion, distal radius or distal ulna  Tinel's testing is POSITIVE along the carpal tunnel, worse on the LEFT compared to the right  Phalen's testing was POSITIVE BILATERALLY  The hand is otherwise neurovascularly intact    1. Bilateral carpal tunnel syndrome  Referral to Pain Clinic        Symptoms began " "approximately July 2023  Pain is predominantly along the dorsal aspect of the hands  Hands \"feel swollen\", but do not necessarily look swollen    thyroid and glucose Labs were NEGATIVE    FAILED bilateral wrist splints which actually worsened her symptoms  Recommend light duty for work until reevaluation in 3 weeks  Also recommend she uses her wrist splints    To see how she is doing with her wrist splints, review labs at that point  We may consider CTS corticosteroid injection if symptoms persist or fail to improve    I still feel that her picture is unclear  Check EMG for evaluation of BILATERAL CTS symptomatology      TSH  0.380 - 5.330 uIU/mL 1.650     Microsomal -Tpo- Abs  0.0 - 9.0 IU/mL <9.                 Component  Ref Range & Units 2 wk ago  (1/15/24) 1 yr ago  (7/8/22) 1 yr ago  (6/21/22) 4 yr ago  (1/15/20) 7 yr ago  (6/3/16) 10 yr ago  (2/5/13) 14 yr ago  (12/6/09)   Sodium  135 - 145 mmol/L 139 137 136 137 141 136 145   Potassium  3.6 - 5.5 mmol/L 3.8 3.8 4.2 3.7 3.3 Low  3.8 3.6   Chloride  96 - 112 mmol/L 103 102 99 101 105 104 118 High    Co2  20 - 33 mmol/L 26 24 24 26 26 20 22   Glucose  65 - 99 mg/dL 76 100 High  110 High  77 100 High  87 117 High  CM   Bun  8 - 22 mg/dL 9 14 10 9 12 9 12   Creatinine  0.50 - 1.40 mg/dL 0.69 0.73 0.69 0.73 0.76 0.65 0.60   Calcium  8.5 - 10.5 mg/dL 9.0 9.2 R 9.4 R 9.6 9.7 9.4 R 7.9 Low  R   Anion Gap  7.0 - 16.0 10.0 11.0 13.0 10.0 R 10.0 R 12.0 High  R         Glycohemoglobin  4.0 - 5.6 % 5.0         Thank you Fransisca Carranza PA-C for allowing me to participate in caring for your patient.  "

## 2024-02-05 ENCOUNTER — APPOINTMENT (OUTPATIENT)
Dept: PHYSICAL MEDICINE AND REHAB | Facility: MEDICAL CENTER | Age: 39
End: 2024-02-05
Payer: COMMERCIAL

## 2024-02-06 ENCOUNTER — OFFICE VISIT (OUTPATIENT)
Dept: PHYSICAL MEDICINE AND REHAB | Facility: MEDICAL CENTER | Age: 39
End: 2024-02-06
Payer: COMMERCIAL

## 2024-02-06 VITALS
SYSTOLIC BLOOD PRESSURE: 124 MMHG | BODY MASS INDEX: 23.66 KG/M2 | DIASTOLIC BLOOD PRESSURE: 80 MMHG | HEART RATE: 76 BPM | WEIGHT: 138.6 LBS | TEMPERATURE: 96.9 F | OXYGEN SATURATION: 98 % | HEIGHT: 64 IN

## 2024-02-06 DIAGNOSIS — Z87.891 FORMER TOBACCO USE: ICD-10-CM

## 2024-02-06 DIAGNOSIS — Z71.82 EXERCISE COUNSELING: ICD-10-CM

## 2024-02-06 DIAGNOSIS — G56.03 BILATERAL CARPAL TUNNEL SYNDROME: ICD-10-CM

## 2024-02-06 DIAGNOSIS — M25.521 RIGHT ELBOW PAIN: ICD-10-CM

## 2024-02-06 DIAGNOSIS — Z71.3 DIETARY COUNSELING: ICD-10-CM

## 2024-02-06 PROCEDURE — 3079F DIAST BP 80-89 MM HG: CPT | Performed by: GENERAL PRACTICE

## 2024-02-06 PROCEDURE — 99204 OFFICE O/P NEW MOD 45 MIN: CPT | Performed by: GENERAL PRACTICE

## 2024-02-06 PROCEDURE — 3074F SYST BP LT 130 MM HG: CPT | Performed by: GENERAL PRACTICE

## 2024-02-06 PROCEDURE — 1126F AMNT PAIN NOTED NONE PRSNT: CPT | Performed by: GENERAL PRACTICE

## 2024-02-06 ASSESSMENT — PATIENT HEALTH QUESTIONNAIRE - PHQ9: CLINICAL INTERPRETATION OF PHQ2 SCORE: 0

## 2024-02-06 ASSESSMENT — FIBROSIS 4 INDEX: FIB4 SCORE: 0.71

## 2024-02-06 ASSESSMENT — PAIN SCALES - GENERAL: PAINLEVEL: NO PAIN

## 2024-02-06 NOTE — PROGRESS NOTES
Physiatry (Physical Medicine and  Rehabilitation)       Patient Name: Jazmine Reinoso   Patient : 1985  Patient Age: 38 y.o.   PCP: Fransisca Carranza P.A.-C.  MRN: [id number]     Date of service: See epic    Chief complaint:   Chief Complaint   Patient presents with    New Patient     Hand pain        Referring provider: Rajiv Lee M.D.    HISTORY    Jazmine Reinoso is a 38 y.o. year old RHD very pleasant female with past medical history of mdd who presents with a referral for Bilateral carpal tunnel syndrome       Medical records review:  I reviewed the note from the referring provider Rajiv Lee M.D. including the note dated 24.    Prior Procedures:   none    HPI:    Evaluation for BILATERAL carpal tunnel syndrome for the past 7months.  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  Noticed tingling, numbness, acchiness, and burning of all digits and worse at work.   shaking her hands helps to relieve the symptoms. No specific activity worsens or relieves or changes the pain.  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 Driving has provoked symptoms.  No benefit with splints which at night but stopped due to difficulty.  No significant tobacco or alcohol use.  No sensory disturbances of upper extremity, face or lower extremities.  No associated neck pain.  Affecting ADLs.     ROS:   Red Flags ROS:   Fever, Chills, Sweats: Denies  Involuntary Weight Loss: Denies  Bladder Incontinence: Denies  Bowel Incontinence: denies  Saddle Anesthesia: Denies  Falls: denies  progressive motor weakness: denies  All other systems reviewed and negative.      GOALS OF TREATMENT: Symptom relief. improve function.        PMHx:   Past Medical History:   Diagnosis Date    Herpes 2019       PSHx:   History reviewed. No pertinent surgical history.    Family history   Family History   Problem Relation Age of Onset    Cancer Father     Cancer Maternal  Grandmother 70       Medications: reviewed on epic.   Outpatient Medications Marked as Taking for the 24 encounter (Office Visit) with Barrera Dennison D.O.   Medication Sig Dispense Refill    fluticasone furoate-vilanterol (BREO ELLIPTA) 100-25 MCG/ACT AEROSOL POWDER, BREATH ACTIVATED Inhale one puff daily and rinse mouth after 28 Each 5    albuterol 108 (90 Base) MCG/ACT Aero Soln inhalation aerosol Inhale 1-2 Puffs every four hours as needed for Shortness of Breath. 1 Each 1    Nebulizers (COMPRESSOR/NEBULIZER) Misc Use nebulizer and instill albuterol nebulizer liquid every 4-6 hours as needed for wheezing 1 Each 0    albuterol (PROVENTIL) 2.5mg/3ml Nebu Soln solution for nebulization Take 3 mL by nebulization every four hours as needed for Shortness of Breath. 25 Each 2        Allergies:   Allergies   Allergen Reactions    Amoxicillin     Pcn [Penicillins]        Social Hx:   Social History     Socioeconomic History    Marital status: Single     Spouse name: Not on file    Number of children: Not on file    Years of education: Not on file    Highest education level: GED or equivalent   Occupational History    Not on file   Tobacco Use    Smoking status: Former     Current packs/day: 0.00     Types: Cigarettes     Quit date:      Years since quittin.1    Smokeless tobacco: Never   Vaping Use    Vaping Use: Former    Substances: Nicotine   Substance and Sexual Activity    Alcohol use: Not Currently     Comment: Occasionally    Drug use: Not Currently     Types: Inhaled     Comment: marijuana    Sexual activity: Yes     Partners: Female   Other Topics Concern    Not on file   Social History Narrative    Not on file     Social Determinants of Health     Financial Resource Strain: Low Risk  (2023)    Overall Financial Resource Strain (CARDIA)     Difficulty of Paying Living Expenses: Not hard at all   Food Insecurity: Unknown (2023)    Hunger Vital Sign     Worried About Running Out of Food in  "the Last Year: Never true     Ran Out of Food in the Last Year: Patient refused   Transportation Needs: No Transportation Needs (11/28/2023)    PRAPARE - Transportation     Lack of Transportation (Medical): No     Lack of Transportation (Non-Medical): No   Physical Activity: Sufficiently Active (11/28/2023)    Exercise Vital Sign     Days of Exercise per Week: 4 days     Minutes of Exercise per Session: 60 min   Stress: No Stress Concern Present (11/28/2023)    [place] [employer] - Occupational Stress Questionnaire     Feeling of Stress : Not at all   Social Connections: Socially Isolated (11/28/2023)    Social Connection and Isolation Panel [NHANES]     Frequency of Communication with Friends and Family: Three times a week     Frequency of Social Gatherings with Friends and Family: Three times a week     Attends Jainism Services: Never     Active Member of Clubs or Organizations: No     Attends Club or Organization Meetings: Never     Marital Status: Never    Intimate Partner Violence: Not on file   Housing Stability: Unknown (11/28/2023)    Housing Stability Vital Sign     Unable to Pay for Housing in the Last Year: No     Number of Places Lived in the Last Year: 1     Unstable Housing in the Last Year: Not on file         EXAMINATION   Vitals: /80 (BP Location: Right arm, Patient Position: Sitting, BP Cuff Size: Adult)   Pulse 76   Temp 36.1 °C (96.9 °F) (Temporal)   Ht 1.626 m (5' 4\")   Wt 62.9 kg (138 lb 9.6 oz)   SpO2 98%   Physical Exam:     Body Habitus: Body mass index is 23.79 kg/m².  Appearance: Well-groomed, well-nourished, not disheveled  Eyes: No scleral icterus to suggest severe liver disease, no proptosis to suggest severe hyperthyroid  ENT -no obvious auditory deficits, no external lesions, moist mucus membranes   Skin -no rashes or lesions noted. No appreciable skin breakdown on exposed skin areas.    Respiratory-  breathing comfortably on room air, no " audible wheezing, full sentences  Cardiovascular- No lower extremity edema noted.   Psychiatric- alert and oriented, calm, comfortable, cooperative     Musculoskeletal and Neuro:  Gait and station - normal gait with reciprocal pattern,  no presence/use of ambulatory device, no arm assistance with sit-to-stand, nonantalgic. no loss of balance during exam.  No change in patient's demeanor with exam.    Grossly normal cranial nerve exam and sensory exam grossly  Coordination grossly intact     Cervical spine   Inspection: No deformities of the skin over the cervical spine. No rashes or lesions.  full   active range of motion in all directions, without  pain    Spurling’s sign: negative bilaterally  No signs of muscular atrophy in bilateral upper extremities   No tenderness to palpation of the cervical spine  No tenderness to palpation on the BILATERAL side in the cervical facets.   No tenderness of paraspinal muscles  Key points for the international standards for neurological classification of spinal cord injury (ISNCSCI) to light touch.   Dermatome R L   C4 2 2   C5 2 2   C6 2 2   C7 2 2   C8 2 2   T1 2 2   T2 2 2     Nerve: neurovascularly intact radial, median, ulnar, and AIN     Motor Exam Upper Extremities   ? Myotome R L   Shoulder flexion C5 5 5   Elbow flexion C5 5 5   Wrist extension C6 5 5   Elbow extension C7 5 5   Finger flexion C8 5 5   Finger abduction T1 5 5     Reflexes  ?  R L   Biceps  2+ 2+   Brachioradialis  2+ 2+   Kelley’s sign negative bilaterally     BILATERAL wrist (compared w/ unaffected side)    Inspection: No swelling, deformity, posturing of fingers, normal use of hand/wrist.    ROM: Active range of motion normal  Special Tests: Nerve entrapment- +++ Tinels and Phalens test bilateral, ++++ R Ulnar nerve tinel test.        MEDICAL DECISION MAKING    Medical records review: see under HPI section.     DATA    Labs:   Lab Results   Component Value Date/Time    SODIUM 139 01/15/2024 08:58 AM  "   POTASSIUM 3.8 01/15/2024 08:58 AM    CHLORIDE 103 01/15/2024 08:58 AM    CO2 26 01/15/2024 08:58 AM    ANION 10.0 01/15/2024 08:58 AM    GLUCOSE 76 01/15/2024 08:58 AM    BUN 9 01/15/2024 08:58 AM    CREATININE 0.69 01/15/2024 08:58 AM    CALCIUM 9.0 01/15/2024 08:58 AM    ASTSGOT 24 07/08/2022 12:00 PM    ALTSGPT 22 07/08/2022 12:00 PM    TBILIRUBIN 0.3 07/08/2022 12:00 PM    ALBUMIN 4.5 07/08/2022 12:00 PM    TOTPROTEIN 6.9 07/08/2022 12:00 PM    GLOBULIN 2.4 07/08/2022 12:00 PM    AGRATIO 1.9 07/08/2022 12:00 PM   ]    No results found for: \"PROTHROMBTM\", \"INR\"     Lab Results   Component Value Date/Time    WBC 7.4 07/08/2022 12:00 PM    RBC 4.21 07/08/2022 12:00 PM    HEMOGLOBIN 14.1 07/08/2022 12:00 PM    HEMATOCRIT 41.1 07/08/2022 12:00 PM    MCV 97.6 07/08/2022 12:00 PM    MCH 33.5 (H) 07/08/2022 12:00 PM    MCHC 34.3 07/08/2022 12:00 PM    MPV 9.8 07/08/2022 12:00 PM    NEUTSPOLYS 65.90 07/08/2022 12:00 PM    LYMPHOCYTES 20.10 (L) 07/08/2022 12:00 PM    MONOCYTES 5.20 07/08/2022 12:00 PM    EOSINOPHILS 7.90 (H) 07/08/2022 12:00 PM    BASOPHILS 0.80 07/08/2022 12:00 PM        Lab Results   Component Value Date/Time    HBA1C 5.0 01/15/2024 08:58 AM        2/6/2024 I performed a limited diagnostic ultrasound for the patient's neuralgia ICD M79.2. I performed a limited diagnostic ultrasound of the RIGHT wrist including the median nerve to evaluate the cause of the patient's neuralgia. The median nerve which shows a cross-sectional area of 8 mm² at the level of the carpal tunnel outlet and 11 mm² at the level of the pronator quadratus. This is NOT consistent with carpal tunnel syndrome.  The images were uploaded to the medical record.     2/6/2024 I performed a limited diagnostic ultrasound for the patient's neuralgia ICD M79.2. I performed a limited diagnostic ultrasound of the LEFT wrist including the median nerve to evaluate the cause of the patient's neuralgia. The median nerve which shows a " cross-sectional area of 6 mm² at the level of the carpal tunnel outlet and 11 mm² at the level of the pronator quadratus. This is NOT consistent with carpal tunnel syndrome.  The images were uploaded to the medical record.       Imaging:   I personally reviewed following images, these are my reads  CT NECK 2016: wn    IMAGING radiology reads. I reviewed the following radiology reads                                                 Results for orders placed in visit on 12/20/21    DX-CHEST-2 VIEWS    Impression  Negative two views of the chest.       Results for orders placed during the hospital encounter of 01/01/13    DX-FINGER(S) 2+    Impression  Lucency within the proximal head of the fifth metacarpal, possibly a subacute/healing fracture.  Clinical correlation is recommended.    Results for orders placed in visit on 08/18/22    DX-FOOT-COMPLETE 3+ RIGHT   Results for orders placed during the hospital encounter of 01/01/13    DX-FOREARM    Impression  No radiographic evidence of acute traumatic injury.              Results for orders placed during the hospital encounter of 05/10/21    DX-LUMBAR SPINE-2 OR 3 VIEWS    Impression  No significant spondylosis. No acute fracture or listhesis.                         Diagnosis   Visit Diagnoses     ICD-10-CM   1. Bilateral carpal tunnel syndrome  G56.03   2. Former tobacco use  Z87.891   3. Exercise counseling  Z71.82   4. Dietary counseling  Z71.3       ASSESSMENT AND PLAN:  Jazmine Anna Kemar 38 y.o. female  Patient with historical and clinical evidence consistent with bilateral CTS with clinical findings but negative on US.  Possible right ulnar neuropathy of the elbow with +tinel test.  No neck symptoms or signs. Will screen with EMG  Recommend vitamin D screening  MDD hx: PHQ9 negative  Dietary and exercising counseling  discussed.  Extensive discussion regarding treatment options, at this time recommending the following:    No orders of the defined types were  placed in this encounter.        PLAN  I did have an extensive discussion regarding pathology and treatment options with the patient today including medications, injections, physical agents (eg. water, heat, cold, TENS), therapy-based programs (eg. massage, stretching/traction, exercise), alternative modalities (eg. Acupuncture, OMT, chiropractor, etc), and lastly surgical intervention:  -Medications/Modalities:   Previously prescribed medications  -Limitations:    Activity as tolerated  -Brace/orthotic/assistive devices: Attempted use with splints but did not tolerate  -Therapy (PT/OT/Aquatherapy): will consider at next visit to focus on strengthening and stretching and may include physical agents (eg. water, heat, cold, TENS) and/or therapy-based programs (eg. massage, stretching/traction, exercise),  -Home exercise program: encouraged activity  -Office Injections: not indicated at this time  -Diagnostic workup: reviewed today as above  -Interventional program: I would  consider the patient for  injection depending on the results of the above   -Referrals: EMG PM&R   -Outside records requested:  none required      Follow-up: EMG, Return to clinic for follow-up of symptomatology, or sooner as needed for any acute issues.  Patient expressed understanding of the management plan. Patient (and Family Members) was/were encouraged to call if any worries, issues, problems or concerns prior to the next visit     Please note that this dictation was created using voice recognition software. I have made every reasonable attempt to correct obvious errors but there may be errors of grammar and content that I may have overlooked prior to finalization of this note.      Barrera Dennison,   Physical Medicine and Rehabilitation  [facility]         DARIA Carranza P.A.-C.   Rajiv Guardado M.D.

## 2024-02-13 ENCOUNTER — OFFICE VISIT (OUTPATIENT)
Dept: PHYSICAL MEDICINE AND REHAB | Facility: MEDICAL CENTER | Age: 39
End: 2024-02-13
Payer: COMMERCIAL

## 2024-02-13 VITALS
HEART RATE: 86 BPM | OXYGEN SATURATION: 98 % | TEMPERATURE: 97.4 F | HEIGHT: 64 IN | BODY MASS INDEX: 23.66 KG/M2 | WEIGHT: 138.6 LBS | DIASTOLIC BLOOD PRESSURE: 84 MMHG | SYSTOLIC BLOOD PRESSURE: 122 MMHG

## 2024-02-13 DIAGNOSIS — R20.2 NUMBNESS AND TINGLING IN BOTH HANDS: ICD-10-CM

## 2024-02-13 DIAGNOSIS — R20.0 NUMBNESS AND TINGLING IN BOTH HANDS: ICD-10-CM

## 2024-02-13 PROCEDURE — 95886 MUSC TEST DONE W/N TEST COMP: CPT | Performed by: GENERAL PRACTICE

## 2024-02-13 PROCEDURE — 95911 NRV CNDJ TEST 9-10 STUDIES: CPT | Performed by: GENERAL PRACTICE

## 2024-02-13 PROCEDURE — 3074F SYST BP LT 130 MM HG: CPT | Performed by: GENERAL PRACTICE

## 2024-02-13 PROCEDURE — 3079F DIAST BP 80-89 MM HG: CPT | Performed by: GENERAL PRACTICE

## 2024-02-13 PROCEDURE — 1126F AMNT PAIN NOTED NONE PRSNT: CPT | Performed by: GENERAL PRACTICE

## 2024-02-13 ASSESSMENT — FIBROSIS 4 INDEX: FIB4 SCORE: 0.71

## 2024-02-13 ASSESSMENT — PAIN SCALES - GENERAL: PAINLEVEL: NO PAIN

## 2024-02-13 NOTE — Clinical Note
Good morning Dr Lee and Ms Dillon,  Her EMG study was normal along with a negative screen with Ultrasound. Consider repeat EMG in 3-6months. Please contact if further questions.  Dr Dennison

## 2024-02-13 NOTE — PROCEDURES
"Electromyography Report          Name: Jazmine Reinoso    MRN: [id number]   YOB: 1985 (38 y.o.)   Sex: female   Height:   Vitals:    02/13/24 0854   BP: 122/84   Weight: 62.9 kg (138 lb 9.6 oz)   Height: 1.626 m (5' 4\")       Examining Physician: Barrera Dennison D.O.     EMG Examination Date: 2/13/2024     Impression:      This is a normal study.   There is no electrodiagnostic evidence of BILATERAL median neuropathy of the wrists or ulnar neuropathy.    There is no electrodiagnostic evidence of right upper extremity radiculopathy in segments tested.  Please note, however, that radiculopathy screening via needle EMG assesses motor nerve fibers only, and does not rule out radiculopathies that affects solely sensory nerve roots.    Recommendations: occupational therapy; consider repeat EMG in 3-6months    Physical exam notable for -ulnar tinel test    Nerve Conduction Studies and Electromyography  Examination Findings:    All nerve conduction studies (as indicated in the following tables) were within normal limits.  All left vs. right side differences were within normal limits.  The upper limit of normal decrease in amplitude from 1 side to the other is 54% for the median motor nerve.    All examined muscles (as indicated in the following table) showed no evidence of electrical instability.      Nerve Conduction Studies  Anti Sensory Summary Table     Stim Site NR Peak (ms) Norm Peak (ms) P-T Amp (µV) Norm P-T Amp Site1 Site2 Delta-P (ms) Dist (cm) Rell (m/s) Norm Rell (m/s)   Left Median Anti Sensory (2nd Digit)   Wrist    3.0 <3.6 67.7 >10 Wrist 2nd Digit 3.0 14.0 47 >39   Right Median Anti Sensory (2nd Digit)   Wrist    2.9 <3.6 61.7 >10 Wrist 2nd Digit 2.9 14.0 48 >39   Left Ulnar Anti Sensory (5th Digit)   Wrist    3.2 <3.7 38.3 >15.0 Wrist 5th Digit 3.2 14.0 44 >38   Right Ulnar Anti Sensory (5th Digit)   Wrist    3.2 <3.7 86.2 >15.0 Wrist 5th Digit 3.2 14.0 44 >38     Motor Summary Table     Stim " Site NR Onset (ms) Norm Onset (ms) O-P Amp (mV) Norm O-P Amp Site1 Site2 Delta-0 (ms) Dist (cm) Rell (m/s) Norm Rell (m/s)   Left Median Motor (Abd Poll Brev)   Wrist    3.0 <4.2 11.1 >5 Elbow Wrist 4.7 26.5 56 >50   Elbow    7.7  11.4          Right Median Motor (Abd Poll Brev)   Wrist    2.8 <4.2 13.0 >5 Elbow Wrist 4.6 27.0 59 >50   Elbow    7.4  12.6          Left Ulnar Motor (Abd Dig Min)   Wrist    2.4 <4.2 10.2 >3 B Elbow Wrist 3.0 19.0 63 >53   B Elbow    5.4  9.5  A Elbow B Elbow 1.9 10.0 53 >53   A Elbow    7.3  9.1          Right Ulnar Motor (Abd Dig Min)   Wrist    2.3 <4.2 10.7 >3 B Elbow Wrist 2.9 17.5 60 >53   B Elbow    5.2  9.2  A Elbow B Elbow 1.8 11.0 61 >53   A Elbow    7.0  9.0            Comparison Summary Table     Stim Site NR Peak (ms) Norm Peak (ms) P-T Amp (µV) Site1 Site2 Delta-P (ms) Norm Delta (ms)   Right Median/Ulnar Palm Comparison (Wrist - 8cm)   Median Palm    1.9 <2.5 119.4 Median Palm Ulnar Palm 0.0 <0.3   Ulnar Palm    1.9 <2.5 62.7         EMG     Side Muscle Nerve Root Ins Act Fibs Psw Amp Dur Poly Recrt Int Pat Comment   Right 1stDorInt Ulnar C8-T1 Nml Nml Nml Nml Nml 0 Nml Nml    Right FlexCarRad Median C6-7 Nml Nml Nml Nml Nml 0 Nml Nml    Right Biceps Musculocut C5-6 Nml Nml Nml Nml Nml 0 Nml Nml    Right Triceps Radial C6-7-8 Nml Nml Nml Nml Nml 0 Nml Nml    Right Deltoid Axillary C5-6 Nml Nml Nml Nml Nml 0 Nml Nml    Right FlexCarpiUln Ulnar C8,T1 Nml Nml Nml Nml Nml 0 Nml Nml      Waveforms:                                 Unless otherwise noted, the temperature of the patient was monitored continuously and remained between 32 and 36 degrees centigrade during the performance of the nerve conduction studies.    Reference values are from the [facility] normative data guidelines and Timothy related to age guidelines.   The study was done with a monopolar needle examination.         cpt 60110. Nerve conduction studies, 9-10 studies  CPT 19273. needle electromyography,  complete, each extremity.       Barrera Dennison D.O.

## 2024-02-27 ENCOUNTER — OFFICE VISIT (OUTPATIENT)
Dept: MEDICAL GROUP | Facility: MEDICAL CENTER | Age: 39
End: 2024-02-27
Payer: COMMERCIAL

## 2024-02-27 VITALS
OXYGEN SATURATION: 97 % | TEMPERATURE: 97.9 F | DIASTOLIC BLOOD PRESSURE: 86 MMHG | WEIGHT: 139 LBS | BODY MASS INDEX: 23.73 KG/M2 | HEART RATE: 76 BPM | HEIGHT: 64 IN | SYSTOLIC BLOOD PRESSURE: 122 MMHG

## 2024-02-27 DIAGNOSIS — N39.3 STRESS INCONTINENCE OF URINE: ICD-10-CM

## 2024-02-27 DIAGNOSIS — G56.03 BILATERAL CARPAL TUNNEL SYNDROME: ICD-10-CM

## 2024-02-27 DIAGNOSIS — J40 BRONCHITIS: ICD-10-CM

## 2024-02-27 PROCEDURE — 3079F DIAST BP 80-89 MM HG: CPT | Performed by: PHYSICIAN ASSISTANT

## 2024-02-27 PROCEDURE — 3074F SYST BP LT 130 MM HG: CPT | Performed by: PHYSICIAN ASSISTANT

## 2024-02-27 PROCEDURE — 99214 OFFICE O/P EST MOD 30 MIN: CPT | Performed by: PHYSICIAN ASSISTANT

## 2024-02-27 RX ORDER — DEXTROMETHORPHAN HYDROBROMIDE AND PROMETHAZINE HYDROCHLORIDE 15; 6.25 MG/5ML; MG/5ML
SYRUP ORAL
Qty: 180 ML | Refills: 0 | Status: SHIPPED | OUTPATIENT
Start: 2024-02-27 | End: 2024-03-07

## 2024-02-27 RX ORDER — AZITHROMYCIN 250 MG/1
TABLET, FILM COATED ORAL
Qty: 6 TABLET | Refills: 0 | Status: SHIPPED | OUTPATIENT
Start: 2024-02-27 | End: 2024-03-07

## 2024-02-27 RX ORDER — PREDNISONE 20 MG/1
TABLET ORAL
Qty: 10 TABLET | Refills: 0 | Status: SHIPPED | OUTPATIENT
Start: 2024-02-27 | End: 2024-03-07

## 2024-02-27 ASSESSMENT — FIBROSIS 4 INDEX: FIB4 SCORE: 0.71

## 2024-02-27 NOTE — PROGRESS NOTES
Subjective:   Jazmine Reinoso is a 38 y.o. female here today for     HPI: Patient is here to discuss:  Problem   Bilateral Carpal Tunnel Syndrome    February 27, 2024: Has seen Dr. Terrell sports medicine and physiatrist Dr. Dennison.  Being treated and evaluated for bilateral carpal tunnel likely secondary to overuse with job.  Using brace did do EEG that was negative, would like a note for work for modification in light duty. Will be going to PT     Stress Incontinence of Urine    Chronic condition.  Patient has not had intercourse with a male and has had no children.  Feels like she urinates when she jumps, coughs or laughs        Also reports 1 week history of wheezing with cough that is productive of sputum.  Denies shortness of breath, chest pain or chest tightness.  Denies fever chills nausea vomiting abdominal pain diarrhea.  Denies sore throat    Current medicines (including changes today)  Current Outpatient Medications   Medication Sig Dispense Refill    azithromycin (ZITHROMAX) 250 MG Tab Take two tablets on day one, then on tablet the following four days 6 Tablet 0    predniSONE (DELTASONE) 20 MG Tab Take one pill twice a day for five days 10 Tablet 0    promethazine-dextromethorphan (PROMETHAZINE-DM) 6.25-15 MG/5ML syrup Take 5mls every six hours as needed for cough 180 mL 0    Azelastine (ASTELIN) 137 MCG/SPRAY Solution ADMINISTER 1 SPRAY INTO AFFECTED NOSTRIL(S) 2 TIMES A DAY.  ICD 10 J30.9 (Patient not taking: Reported on 2/6/2024) 90 mL 3    predniSONE (DELTASONE) 20 MG Tab Take one pill twice a day for five days (Patient not taking: Reported on 2/6/2024) 10 Tablet 0    naproxen (NAPROSYN) 500 MG Tab Take 1 Tablet by mouth 2 times a day with meals. (Patient not taking: Reported on 2/6/2024) 30 Tablet 0    fluticasone furoate-vilanterol (BREO ELLIPTA) 100-25 MCG/ACT AEROSOL POWDER, BREATH ACTIVATED Inhale one puff daily and rinse mouth after 28 Each 5    albuterol 108 (90 Base) MCG/ACT Aero Soln  "inhalation aerosol Inhale 1-2 Puffs every four hours as needed for Shortness of Breath. 1 Each 1    Nebulizers (COMPRESSOR/NEBULIZER) Misc Use nebulizer and instill albuterol nebulizer liquid every 4-6 hours as needed for wheezing 1 Each 0    albuterol (PROVENTIL) 2.5mg/3ml Nebu Soln solution for nebulization Take 3 mL by nebulization every four hours as needed for Shortness of Breath. 25 Each 2     No current facility-administered medications for this visit.     She  has a past medical history of Herpes (2019).  Amoxicillin and Pcn [penicillins]     Social History and Family History were reviewed and updated.    ROS   No headaches, chest pain, no shortness of breath, abdominal pain, nausea, or vomiting.  All other systems were reviewed and are negative or noted as positive in the HPI.       Objective:     /86 (BP Location: Left arm, Patient Position: Sitting, BP Cuff Size: Adult)   Pulse 76   Temp 36.6 °C (97.9 °F) (Temporal)   Ht 1.626 m (5' 4\")   Wt 63 kg (139 lb)   SpO2 97%  Body mass index is 23.86 kg/m².     Physical Exam:  General: Patient appears well-nourished, well-hydrated, nontoxic  HEENT, normocephalic atraumatic, PERRLA, extraocular movements intact, nares are patent and clear  Neck: No visible masses, thyromegaly or abnormalities noted  Cardiovascular.  Sitting comfortably without visible signs of edema  Lungs: No cyanosis noted, nondyspneic.  Currently clear to auscultation bilaterally without wheezes rales or rhonchi  Skin: Well perfused without evidence of rash or lesions  Neurological: Cranial nerves II through XII intact, normal gait  Musculoskeletal: Normal range of motion, normal strength and no deficit noted     Clinical Course/Lab Analysis:        Assessment and Plan:   The following treatment plan was discussed.  Signs and symptoms for which to return were discussed with patient at length.  Patient verbalized understanding.    Problem List Items Addressed This Visit       " Bilateral carpal tunnel syndrome     Chronic and unstable: Is following with Dr. Dennison physiatry.  Had negative EEG.  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  Recommend pelvic floor therapy, recommend Kegel's, if no improvement may benefit from duloxetine or Detrol and referral to urology for ultrasound-guided cystoscopy with Botox          Other Visit Diagnoses       Bronchitis        Relevant Medications    azithromycin (ZITHROMAX) 250 MG Tab    predniSONE (DELTASONE) 20 MG Tab    promethazine-dextromethorphan (PROMETHAZINE-DM) 6.25-15 MG/5ML syrup           Bronchitis is new and unstable: Likely viral etiology.  Supportive care measures encouraged.  Only fill antibiotic if symptoms are worsening    Followup: 1 month for pap    Please note that this dictation was created using voice recognition software. I have made every reasonable attempt to correct obvious errors, but I expect that there are errors of grammar and possibly content that I did not discover before finalizing the note.

## 2024-02-27 NOTE — ASSESSMENT & PLAN NOTE
Chronic and unstable: Is following with Dr. Dennison physiatry.  Had negative EEG.  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

## 2024-02-27 NOTE — ASSESSMENT & PLAN NOTE
Chronic and unstable  Recommend Pap smear with pelvic exam to evaluate muscle tone.  Recommend pelvic floor therapy, recommend Kegel's, if no improvement may benefit from duloxetine or Detrol and referral to urology for ultrasound-guided cystoscopy with Botox

## 2024-02-27 NOTE — LETTER
February 27, 2024         Patient: Jazmine Reinoso   YOB: 1985   Date of Visit: 2/27/2024           To Whom it May Concern:    Jazmine Reinoso was seen in my clinic on 2/27/2024. She is under my medical supervision.  She is also following with physiatry for bilateral carpal tunnel.  It would be beneficial for her to have light duty with her hands since, specifically, to avoid restraining monkeys.  She will be reevaluated by specialty care in the near future for further treatment and evaluation.    If you have any questions or concerns, please don't hesitate to call.        Sincerely,           Fransisca Carranza P.A.-C.  Electronically Signed

## 2024-03-07 ENCOUNTER — OFFICE VISIT (OUTPATIENT)
Dept: MEDICAL GROUP | Facility: MEDICAL CENTER | Age: 39
End: 2024-03-07
Payer: COMMERCIAL

## 2024-03-07 VITALS
TEMPERATURE: 98.2 F | SYSTOLIC BLOOD PRESSURE: 112 MMHG | HEIGHT: 64 IN | WEIGHT: 137 LBS | HEART RATE: 78 BPM | OXYGEN SATURATION: 96 % | BODY MASS INDEX: 23.39 KG/M2 | DIASTOLIC BLOOD PRESSURE: 68 MMHG

## 2024-03-07 DIAGNOSIS — R10.11 RUQ ABDOMINAL PAIN: ICD-10-CM

## 2024-03-07 DIAGNOSIS — R11.0 NAUSEA: ICD-10-CM

## 2024-03-07 DIAGNOSIS — K59.00 CONSTIPATION, UNSPECIFIED CONSTIPATION TYPE: ICD-10-CM

## 2024-03-07 DIAGNOSIS — G56.03 BILATERAL CARPAL TUNNEL SYNDROME: ICD-10-CM

## 2024-03-07 PROCEDURE — 3078F DIAST BP <80 MM HG: CPT | Performed by: PHYSICIAN ASSISTANT

## 2024-03-07 PROCEDURE — 99214 OFFICE O/P EST MOD 30 MIN: CPT | Performed by: PHYSICIAN ASSISTANT

## 2024-03-07 PROCEDURE — 3074F SYST BP LT 130 MM HG: CPT | Performed by: PHYSICIAN ASSISTANT

## 2024-03-07 RX ORDER — DICYCLOMINE HCL 20 MG
TABLET ORAL
Qty: 30 TABLET | Refills: 1 | Status: SHIPPED | OUTPATIENT
Start: 2024-03-07

## 2024-03-07 RX ORDER — OMEPRAZOLE 40 MG/1
40 CAPSULE, DELAYED RELEASE ORAL DAILY
Qty: 30 CAPSULE | Refills: 3 | Status: SHIPPED | OUTPATIENT
Start: 2024-03-07

## 2024-03-07 ASSESSMENT — FIBROSIS 4 INDEX: FIB4 SCORE: 0.71

## 2024-03-07 NOTE — LETTER
February 27, 2024         Patient: Jazmine Reinoso   YOB: 1985   Date of Visit: 3/7/2024           To Whom it May Concern:    Jazmine Reinoso was seen in my clinic on 3/7/2024. She is under my medical supervision.  She is also following with physiatry for bilateral carpal tunnel.  It would be beneficial for her to have light duty with her hands, specifically, to avoid restraining monkeys for eight weeks.  She will be reevaluated by specialty care within eight weeks for further treatment and evaluation.    If you have any questions or concerns, please don't hesitate to call.        Sincerely,           Fransisca Carranza P.A.-C.  Electronically Signed

## 2024-03-07 NOTE — PROGRESS NOTES
"Subjective:   Jazmine Reinoso is a 38 y.o. female here today for     HPI: Patient is here to discuss:    Patient comes in with a 5-day history of nauseousness and right upper quadrant pain after meals.  Denies fevers, chills but has had nausea.  Denies vomiting.  Has had constipation.  Reports infrequent hard bowel movements    Current medicines (including changes today)  Current Outpatient Medications   Medication Sig Dispense Refill    omeprazole (PRILOSEC) 40 MG delayed-release capsule Take 1 Capsule by mouth every day. 30 Capsule 3    dicyclomine (BENTYL) 20 MG Tab Take one tablet by mouth every 6 hours as needed for abdominal cramping 30 Tablet 1    fluticasone furoate-vilanterol (BREO ELLIPTA) 100-25 MCG/ACT AEROSOL POWDER, BREATH ACTIVATED Inhale one puff daily and rinse mouth after 28 Each 5    albuterol 108 (90 Base) MCG/ACT Aero Soln inhalation aerosol Inhale 1-2 Puffs every four hours as needed for Shortness of Breath. 1 Each 1    Nebulizers (COMPRESSOR/NEBULIZER) Misc Use nebulizer and instill albuterol nebulizer liquid every 4-6 hours as needed for wheezing 1 Each 0    albuterol (PROVENTIL) 2.5mg/3ml Nebu Soln solution for nebulization Take 3 mL by nebulization every four hours as needed for Shortness of Breath. 25 Each 2     No current facility-administered medications for this visit.     She  has a past medical history of Herpes (2019).  Amoxicillin and Pcn [penicillins]     Social History and Family History were reviewed and updated.    ROS   No headaches, chest pain, no shortness of breath, abdominal pain, nausea, or vomiting.  All other systems were reviewed and are negative or noted as positive in the HPI.       Objective:     /68 (BP Location: Left arm, Patient Position: Sitting, BP Cuff Size: Adult)   Pulse 78   Temp 36.8 °C (98.2 °F) (Temporal)   Ht 1.626 m (5' 4\")   Wt 62.1 kg (137 lb)   SpO2 96%  Body mass index is 23.52 kg/m².     Physical Exam:  Gen.: Patient is A and O ×3, " no acute distress, well-nourished well-hydrated  Vitals: Are listed and unremarkable  HEENT: Heads normocephalic, atraumatic, PERRLA, extraocular movements intact, TMs and oropharynx clear  Neck: Soft supple without cervical lymphadenopathy  Cardiovascular: Regular rate and rhythm normal S1 and S2. No murmurs, rubs or gallops  Lungs are clear to auscultation bilaterally. no wheezes rales or rhonchi  Abdomen is soft, nontender, nondistended with good bowel sounds, no hepatosplenomegaly  Skin: Is well perfused without evidence of rash or lesions  Neurological:  cranial nerves II through XII were assessed and intact.  Musculoskeletal: Full range of motion, 5 out of 5 strength against resistance  Neurovascularly: Intact with a 2 second cap refill, good distal pulses       Clinical Course/Lab Analysis:        Assessment and Plan:   The following treatment plan was discussed.  Signs and symptoms for which to return were discussed with patient at length.  Patient verbalized understanding.    Problem List Items Addressed This Visit       Bilateral carpal tunnel syndrome    Relevant Orders    Referral to Physical Therapy     Other Visit Diagnoses       Nausea        Relevant Medications    omeprazole (PRILOSEC) 40 MG delayed-release capsule    dicyclomine (BENTYL) 20 MG Tab    Other Relevant Orders    US-RUQ    CBC WITH DIFFERENTIAL    Comp Metabolic Panel    Constipation, unspecified constipation type        Relevant Medications    omeprazole (PRILOSEC) 40 MG delayed-release capsule    dicyclomine (BENTYL) 20 MG Tab    Other Relevant Orders    US-RUQ    CBC WITH DIFFERENTIAL    Comp Metabolic Panel    RUQ abdominal pain        Relevant Medications    omeprazole (PRILOSEC) 40 MG delayed-release capsule    dicyclomine (BENTYL) 20 MG Tab    Other Relevant Orders    US-RUQ    CBC WITH DIFFERENTIAL    Comp Metabolic Panel               Followup: 2 weeks    Please note that this dictation was created using voice recognition  software. I have made every reasonable attempt to correct obvious errors, but I expect that there are errors of grammar and possibly content that I did not discover before finalizing the note.

## 2024-03-08 ENCOUNTER — HOSPITAL ENCOUNTER (OUTPATIENT)
Dept: LAB | Facility: MEDICAL CENTER | Age: 39
End: 2024-03-08
Attending: PHYSICIAN ASSISTANT
Payer: COMMERCIAL

## 2024-03-08 DIAGNOSIS — R10.11 RUQ ABDOMINAL PAIN: ICD-10-CM

## 2024-03-08 DIAGNOSIS — M77.8 WRIST TENDONITIS: ICD-10-CM

## 2024-03-08 DIAGNOSIS — K59.00 CONSTIPATION, UNSPECIFIED CONSTIPATION TYPE: ICD-10-CM

## 2024-03-08 DIAGNOSIS — R11.0 NAUSEA: ICD-10-CM

## 2024-03-08 LAB
ALBUMIN SERPL BCP-MCNC: 4.4 G/DL (ref 3.2–4.9)
ALBUMIN/GLOB SERPL: 2 G/DL
ALP SERPL-CCNC: 50 U/L (ref 30–99)
ALT SERPL-CCNC: 16 U/L (ref 2–50)
ANION GAP SERPL CALC-SCNC: 11 MMOL/L (ref 7–16)
AST SERPL-CCNC: 22 U/L (ref 12–45)
BASOPHILS # BLD AUTO: 0.5 % (ref 0–1.8)
BASOPHILS # BLD: 0.03 K/UL (ref 0–0.12)
BILIRUB SERPL-MCNC: 0.5 MG/DL (ref 0.1–1.5)
BUN SERPL-MCNC: 12 MG/DL (ref 8–22)
CALCIUM ALBUM COR SERPL-MCNC: 8.7 MG/DL (ref 8.5–10.5)
CALCIUM SERPL-MCNC: 9 MG/DL (ref 8.5–10.5)
CHLORIDE SERPL-SCNC: 104 MMOL/L (ref 96–112)
CO2 SERPL-SCNC: 25 MMOL/L (ref 20–33)
CREAT SERPL-MCNC: 0.62 MG/DL (ref 0.5–1.4)
EOSINOPHIL # BLD AUTO: 0.24 K/UL (ref 0–0.51)
EOSINOPHIL NFR BLD: 4.1 % (ref 0–6.9)
ERYTHROCYTE [DISTWIDTH] IN BLOOD BY AUTOMATED COUNT: 43.7 FL (ref 35.9–50)
ERYTHROCYTE [SEDIMENTATION RATE] IN BLOOD BY WESTERGREN METHOD: 2 MM/HOUR (ref 0–25)
FASTING STATUS PATIENT QL REPORTED: NORMAL
GFR SERPLBLD CREATININE-BSD FMLA CKD-EPI: 118 ML/MIN/1.73 M 2
GLOBULIN SER CALC-MCNC: 2.2 G/DL (ref 1.9–3.5)
GLUCOSE SERPL-MCNC: 79 MG/DL (ref 65–99)
HCT VFR BLD AUTO: 43.4 % (ref 37–47)
HGB BLD-MCNC: 14.5 G/DL (ref 12–16)
IMM GRANULOCYTES # BLD AUTO: 0.01 K/UL (ref 0–0.11)
IMM GRANULOCYTES NFR BLD AUTO: 0.2 % (ref 0–0.9)
LYMPHOCYTES # BLD AUTO: 1.7 K/UL (ref 1–4.8)
LYMPHOCYTES NFR BLD: 28.7 % (ref 22–41)
MCH RBC QN AUTO: 32.7 PG (ref 27–33)
MCHC RBC AUTO-ENTMCNC: 33.4 G/DL (ref 32.2–35.5)
MCV RBC AUTO: 98 FL (ref 81.4–97.8)
MONOCYTES # BLD AUTO: 0.38 K/UL (ref 0–0.85)
MONOCYTES NFR BLD AUTO: 6.4 % (ref 0–13.4)
NEUTROPHILS # BLD AUTO: 3.56 K/UL (ref 1.82–7.42)
NEUTROPHILS NFR BLD: 60.1 % (ref 44–72)
NRBC # BLD AUTO: 0 K/UL
NRBC BLD-RTO: 0 /100 WBC (ref 0–0.2)
PLATELET # BLD AUTO: 270 K/UL (ref 164–446)
PMV BLD AUTO: 10.8 FL (ref 9–12.9)
POTASSIUM SERPL-SCNC: 4.1 MMOL/L (ref 3.6–5.5)
PROT SERPL-MCNC: 6.6 G/DL (ref 6–8.2)
RBC # BLD AUTO: 4.43 M/UL (ref 4.2–5.4)
RHEUMATOID FACT SER IA-ACNC: <10 IU/ML (ref 0–14)
SODIUM SERPL-SCNC: 140 MMOL/L (ref 135–145)
WBC # BLD AUTO: 5.9 K/UL (ref 4.8–10.8)

## 2024-03-08 PROCEDURE — 86431 RHEUMATOID FACTOR QUANT: CPT

## 2024-03-08 PROCEDURE — 85652 RBC SED RATE AUTOMATED: CPT

## 2024-03-08 PROCEDURE — 80053 COMPREHEN METABOLIC PANEL: CPT

## 2024-03-08 PROCEDURE — 36415 COLL VENOUS BLD VENIPUNCTURE: CPT

## 2024-03-08 PROCEDURE — 85025 COMPLETE CBC W/AUTO DIFF WBC: CPT

## 2024-03-11 ENCOUNTER — APPOINTMENT (OUTPATIENT)
Dept: MEDICAL GROUP | Facility: MEDICAL CENTER | Age: 39
End: 2024-03-11
Payer: COMMERCIAL

## 2024-03-18 DIAGNOSIS — G56.03 BILATERAL CARPAL TUNNEL SYNDROME: ICD-10-CM

## 2024-03-20 DIAGNOSIS — J45.41 MODERATE PERSISTENT ASTHMA WITH ACUTE EXACERBATION: ICD-10-CM

## 2024-03-21 RX ORDER — ALBUTEROL SULFATE 90 UG/1
1-2 AEROSOL, METERED RESPIRATORY (INHALATION) EVERY 4 HOURS PRN
Qty: 1 EACH | Refills: 1 | Status: SHIPPED | OUTPATIENT
Start: 2024-03-21

## 2024-03-21 NOTE — TELEPHONE ENCOUNTER
Received request via: Pharmacy    Was the patient seen in the last year in this department? Yes    Does the patient have an active prescription (recently filled or refills available) for medication(s) requested? No    Pharmacy Name: [facility]    Does the patient have detention Plus and need 100 day supply (blood pressure, diabetes and cholesterol meds only)? Patient does not have SCP

## 2024-04-02 ENCOUNTER — APPOINTMENT (OUTPATIENT)
Dept: MEDICAL GROUP | Facility: MEDICAL CENTER | Age: 39
End: 2024-04-02
Payer: COMMERCIAL

## 2024-04-09 ENCOUNTER — APPOINTMENT (OUTPATIENT)
Dept: MEDICAL GROUP | Facility: MEDICAL CENTER | Age: 39
End: 2024-04-09
Payer: COMMERCIAL

## 2024-04-09 VITALS
HEIGHT: 64 IN | TEMPERATURE: 97 F | OXYGEN SATURATION: 98 % | WEIGHT: 138 LBS | DIASTOLIC BLOOD PRESSURE: 94 MMHG | HEART RATE: 101 BPM | BODY MASS INDEX: 23.56 KG/M2 | SYSTOLIC BLOOD PRESSURE: 124 MMHG

## 2024-04-09 DIAGNOSIS — G56.03 BILATERAL CARPAL TUNNEL SYNDROME: ICD-10-CM

## 2024-04-09 PROCEDURE — 3074F SYST BP LT 130 MM HG: CPT | Performed by: PHYSICIAN ASSISTANT

## 2024-04-09 PROCEDURE — 99214 OFFICE O/P EST MOD 30 MIN: CPT | Performed by: PHYSICIAN ASSISTANT

## 2024-04-09 PROCEDURE — 3080F DIAST BP >= 90 MM HG: CPT | Performed by: PHYSICIAN ASSISTANT

## 2024-04-09 ASSESSMENT — FIBROSIS 4 INDEX: FIB4 SCORE: .7333333333333333333

## 2024-04-09 NOTE — PROGRESS NOTES
Subjective:   Jazmine Reinoso is a 38 y.o. female here today for     HPI: Patient is here to discuss:  Problem   Bilateral Carpal Tunnel Syndrome    February 27, 2024: Has seen Dr. Terrell sports medicine and physiatrist Dr. Dennison.  Being treated and evaluated for bilateral carpal tunnel likely secondary to overuse with job.  Using brace did do EEG that was negative, would like a note for work for modification in light duty. Will be going to PT    4/9/24: Patient is here with forms to fill out for work.  Does not use brace at work because it prevents her from doing her duties but does wish for light duty.  Had negative EEG.  Sees Dr. Terrell sports medicine also physiatrist Dr. Dennison.  Is pending physical therapy            Current medicines (including changes today)  Current Outpatient Medications   Medication Sig Dispense Refill    albuterol 108 (90 Base) MCG/ACT Aero Soln inhalation aerosol Inhale 1-2 Puffs every four hours as needed for Shortness of Breath. 1 Each 1    omeprazole (PRILOSEC) 40 MG delayed-release capsule Take 1 Capsule by mouth every day. 30 Capsule 3    dicyclomine (BENTYL) 20 MG Tab Take one tablet by mouth every 6 hours as needed for abdominal cramping 30 Tablet 1    fluticasone furoate-vilanterol (BREO ELLIPTA) 100-25 MCG/ACT AEROSOL POWDER, BREATH ACTIVATED Inhale one puff daily and rinse mouth after 28 Each 5    Nebulizers (COMPRESSOR/NEBULIZER) Misc Use nebulizer and instill albuterol nebulizer liquid every 4-6 hours as needed for wheezing 1 Each 0    albuterol (PROVENTIL) 2.5mg/3ml Nebu Soln solution for nebulization Take 3 mL by nebulization every four hours as needed for Shortness of Breath. 25 Each 2     No current facility-administered medications for this visit.     She  has a past medical history of Herpes (2019).  Amoxicillin and Pcn [penicillins]     Social History and Family History were reviewed and updated.    ROS   No headaches, chest pain, no shortness of breath,  "abdominal pain, nausea, or vomiting.  All other systems were reviewed and are negative or noted as positive in the HPI.       Objective:     BP (!) 124/94   Pulse (!) 101   Temp 36.1 °C (97 °F) (Temporal)   Ht 1.626 m (5' 4\")   Wt 62.6 kg (138 lb)   SpO2 98%  Body mass index is 23.69 kg/m².     Physical Exam:  General: Patient appears well-nourished, well-hydrated, nontoxic  HEENT, normocephalic atraumatic, PERRLA, extraocular movements intact, nares are patent and clear  Neck: No visible masses, thyromegaly or abnormalities noted  Cardiovascular.  Sitting comfortably without visible signs of edema  Lungs: No cyanosis noted, nondyspneic  Skin: Well perfused without evidence of rash or lesions  Neurological: Cranial nerves II through XII intact, normal gait  Musculoskeletal: Normal range of motion, normal strength and no deficit noted     Clinical Course/Lab Analysis:        Assessment and Plan:   The following treatment plan was discussed.  Signs and symptoms for which to return were discussed with patient at length.  Patient verbalized understanding.    Problem List Items Addressed This Visit       Bilateral carpal tunnel syndrome     Chronic and unstable  4/9/24: Patient is here with forms to fill out for work.  Does not use brace at work because it prevents her from doing her duties but does wish for light duty.  Had negative EEG.  Sees Dr. Terrell sports medicine also physiatrist Dr. Dennison.  Is pending physical therapy and referral to orthopedics given for possible steroid injection versus release of carpal tunnel procedure.  [facility] reasonable accommodation questionnaire system filled out today please see scanned section         Relevant Orders    Referral to Orthopedics          Followup:3 mo    Please note that this dictation was created using voice recognition software. I have made every reasonable attempt to correct obvious errors, but I expect that there are errors of grammar and " possibly content that I did not discover before finalizing the note.

## 2024-04-09 NOTE — ASSESSMENT & PLAN NOTE
Chronic and unstable  4/9/24: Patient is here with forms to fill out for work.  Does not use brace at work because it prevents her from doing her duties but does wish for light duty.  Had negative EEG.  Sees Dr. Terrell sports medicine also physiatrist Dr. Dennison.  Is pending physical therapy and referral to orthopedics given for possible steroid injection versus release of carpal tunnel procedure.  [employer] reasonable accommodation questionnaire system filled out today please see scanned section

## 2024-05-13 ENCOUNTER — OFFICE VISIT (OUTPATIENT)
Dept: MEDICAL GROUP | Facility: MEDICAL CENTER | Age: 39
End: 2024-05-13
Payer: COMMERCIAL

## 2024-05-13 VITALS
WEIGHT: 131 LBS | OXYGEN SATURATION: 98 % | TEMPERATURE: 97.6 F | HEART RATE: 66 BPM | SYSTOLIC BLOOD PRESSURE: 110 MMHG | BODY MASS INDEX: 22.49 KG/M2 | DIASTOLIC BLOOD PRESSURE: 88 MMHG

## 2024-05-13 DIAGNOSIS — J45.41 MODERATE PERSISTENT ASTHMA WITH ACUTE EXACERBATION: ICD-10-CM

## 2024-05-13 PROCEDURE — 3079F DIAST BP 80-89 MM HG: CPT | Performed by: PHYSICIAN ASSISTANT

## 2024-05-13 PROCEDURE — 3074F SYST BP LT 130 MM HG: CPT | Performed by: PHYSICIAN ASSISTANT

## 2024-05-13 PROCEDURE — 99214 OFFICE O/P EST MOD 30 MIN: CPT | Performed by: PHYSICIAN ASSISTANT

## 2024-05-13 RX ORDER — MONTELUKAST SODIUM 10 MG/1
10 TABLET ORAL DAILY
Qty: 30 TABLET | Refills: 3 | Status: SHIPPED | OUTPATIENT
Start: 2024-05-13

## 2024-05-13 RX ORDER — PREDNISONE 20 MG/1
TABLET ORAL
Qty: 10 TABLET | Refills: 0 | Status: SHIPPED | OUTPATIENT
Start: 2024-05-13

## 2024-05-13 ASSESSMENT — FIBROSIS 4 INDEX: FIB4 SCORE: .7333333333333333333

## 2024-05-13 NOTE — PROGRESS NOTES
Subjective:     History of Present Illness  The patient presents for evaluation of asthma.    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 She describes a sensation of her lungs constricting, necessitating her to pause while spraying the monkey's cages. Concurrently, she experienced breathlessness while descending the stairs to the locker of her walker, which was alleviated by the use of her inhaler. She is uncertain if this episode was an asthma attack. She denies experiencing stress. Mild wheezing and shortness of breath have been reported, particularly at night, necessitating the use of her inhaler. During the day, she feels fine, albeit with occasional shortness of breath. She smokes marijuana approximately three times daily. Despite owning a nebulizer, she has not needed it recently. Her daily medication regimen includes Breo for prevention and albuterol as needed.      Current medicines (including changes today)  Current Outpatient Medications   Medication Sig Dispense Refill    predniSONE (DELTASONE) 20 MG Tab Take one pill twice a day for five days 10 Tablet 0    montelukast (SINGULAIR) 10 MG Tab Take 1 Tablet by mouth every day. 30 Tablet 3    albuterol 108 (90 Base) MCG/ACT Aero Soln inhalation aerosol Inhale 1-2 Puffs every four hours as needed for Shortness of Breath. 1 Each 1    omeprazole (PRILOSEC) 40 MG delayed-release capsule Take 1 Capsule by mouth every day. 30 Capsule 3    dicyclomine (BENTYL) 20 MG Tab Take one tablet by mouth every 6 hours as needed for abdominal cramping 30 Tablet 1    fluticasone furoate-vilanterol (BREO ELLIPTA) 100-25 MCG/ACT AEROSOL POWDER, BREATH ACTIVATED Inhale one puff daily and rinse mouth after 28 Each 5    Nebulizers (COMPRESSOR/NEBULIZER) Misc Use nebulizer and instill albuterol nebulizer liquid every 4-6 hours as needed for wheezing 1 Each 0    albuterol (PROVENTIL) 2.5mg/3ml Nebu Soln  solution for nebulization Take 3 mL by nebulization every four hours as needed for Shortness of Breath. 25 Each 2     No current facility-administered medications for this visit.     She  has a past medical history of Herpes (2019).    ROS   No chest pain, no shortness of breath, no abdominal pain  Positive ROS as per HPI.  All other systems reviewed and are negative.     Objective:     /88 (BP Location: Left arm, Patient Position: Sitting, BP Cuff Size: Adult)   Pulse 66   Temp 36.4 °C (97.6 °F) (Temporal)   Wt 59.4 kg (131 lb)   SpO2 98%  Body mass index is 22.49 kg/m².   Physical Exam  No wheezing in the lungs, but lungs are tighter than normal.  Constitutional: Alert, no distress.  Skin: Warm, dry, good turgor, no rashes in visible areas.  Eye: Equal, round and reactive, conjunctiva clear, lids normal.  ENMT: Lips without lesions, good dentition, oropharynx clear.  Neck: Trachea midline, no masses, no thyromegaly. No cervical or supraclavicular lymphadenopathy  Respiratory: Unlabored respiratory effort, lungs clear to auscultation, no wheezes, no ronchi.  Cardiovascular: Normal S1, S2, no murmur, no edema.  Abdomen: Soft, non-tender, no masses, no hepatosplenomegaly.  Psych: Alert and oriented x3, normal affect and mood.      Results          Assessment and Plan:   The following treatment plan was discussed    Assessment & Plan  1. Asthma/reactive airway disease.  The patient acknowledges her thrice-daily use of marijuana. The patient is advised to persist with her current regimen of Breo, her rescue inhaler, and the use of a nebulizer as required. It is imperative that she ceases this habit. Montelukast will be added to her treatment plan to potentially alleviate her symptoms. Additionally, a 5-day course of prednisone has been prescribed.    Follow-up  The patient is scheduled for a follow-up visit in 4 weeks.      ORDERS:  1. Moderate persistent asthma with acute exacerbation    - predniSONE  (DELTASONE) 20 MG Tab; Take one pill twice a day for five days  Dispense: 10 Tablet; Refill: 0  - montelukast (SINGULAIR) 10 MG Tab; Take 1 Tablet by mouth every day.  Dispense: 30 Tablet; Refill: 3          Follow Up: 4 weeks    Please note that this dictation was created using voice recognition software. I have made every reasonable attempt to correct obvious errors, but I expect that there are errors of grammar and possibly content that I did not discover before finalizing the note.      Attestation      Verbal consent was acquired by the patient to use [employer] ambient listening note generation during this visit Yes

## 2024-05-19 DIAGNOSIS — K59.00 CONSTIPATION, UNSPECIFIED CONSTIPATION TYPE: ICD-10-CM

## 2024-05-19 DIAGNOSIS — R10.11 RUQ ABDOMINAL PAIN: ICD-10-CM

## 2024-05-19 DIAGNOSIS — R11.0 NAUSEA: ICD-10-CM

## 2024-05-21 NOTE — TELEPHONE ENCOUNTER
Received request via: Pharmacy    Was the patient seen in the last year in this department? Yes    Does the patient have an active prescription (recently filled or refills available) for medication(s) requested? No    Pharmacy Name: [facility]/pharmacy #[id number] - [place], [place] - [address]     Does the patient have residential Plus and need 100 day supply (blood pressure, diabetes and cholesterol meds only)? Patient does not have SCP    Pharmacy comment: Alternative Requested:NEED 90 DAYS TO FILL PER INSURANCE.

## 2024-05-22 RX ORDER — OMEPRAZOLE 40 MG/1
40 CAPSULE, DELAYED RELEASE ORAL DAILY
Qty: 30 CAPSULE | Refills: 3 | Status: SHIPPED
Start: 2024-05-22

## 2025-02-13 ENCOUNTER — OFFICE VISIT (OUTPATIENT)
Dept: URGENT CARE | Facility: CLINIC | Age: 40
End: 2025-02-13

## 2025-02-13 VITALS
TEMPERATURE: 97.3 F | HEART RATE: 82 BPM | BODY MASS INDEX: 23.72 KG/M2 | RESPIRATION RATE: 14 BRPM | HEIGHT: 65 IN | OXYGEN SATURATION: 98 % | WEIGHT: 142.4 LBS

## 2025-02-13 DIAGNOSIS — Z02.1 PRE-EMPLOYMENT DRUG SCREENING: ICD-10-CM

## 2025-02-13 LAB
AMP AMPHETAMINE: NORMAL
COC COCAINE: NORMAL
INT CON NEG: NEGATIVE
INT CON POS: POSITIVE
MET METHAMPHETAMINES: NORMAL
OPI OPIATES: NORMAL
PCP PHENCYCLIDINE: NORMAL
POC DRUG COMMENT 753798-OCCUPATIONAL HEALTH: NEGATIVE
THC: NORMAL

## 2025-02-13 PROCEDURE — 80305 DRUG TEST PRSMV DIR OPT OBS: CPT | Performed by: PHYSICIAN ASSISTANT

## 2025-02-13 PROCEDURE — 8915 PR COMPREHENSIVE PHYSICAL: Performed by: PHYSICIAN ASSISTANT

## 2025-02-13 ASSESSMENT — FIBROSIS 4 INDEX: FIB4 SCORE: 0.75

## 2025-02-13 NOTE — PROGRESS NOTES
"Subjective:   Jazmine Reinoso is a 39 y.o. female who presents for Employment Physical (Barbara with 40 lb lift test)      Jazmine Reinoso presents to Urgent Care for preemployment physical. Patient has no complaints or concerns regarding their health or ability to perform indicated job duties. See scanned paperwork.     ROS    Medications, Allergies, and current problem list reviewed today in Epic.     Objective:     Pulse 82   Temp 36.3 °C (97.3 °F) (Temporal)   Resp 14   Ht 1.638 m (5' 4.5\")   Wt 64.6 kg (142 lb 6.4 oz)   SpO2 98%     Physical Exam    Assessment/Plan:     1. Pre-employment drug screening  - POCT 6 Panel Urine Drug Screen      - See scanned documentation  - Addressed any patient/guardian concerns  - Any red flags addressed in documentation    Advised the patient to follow-up with the primary care physician for recheck, reevaluation, and consideration of further management.    Please note that this dictation was created using voice recognition software. I have made a reasonable attempt to correct obvious errors, but I expect that there are errors of grammar and possibly content that I did not discover before finalizing the note.    This note was electronically signed by Sunday Garcia PA-C  "